# Patient Record
Sex: FEMALE | Race: WHITE | NOT HISPANIC OR LATINO | Employment: OTHER | URBAN - METROPOLITAN AREA
[De-identification: names, ages, dates, MRNs, and addresses within clinical notes are randomized per-mention and may not be internally consistent; named-entity substitution may affect disease eponyms.]

---

## 2023-01-01 ENCOUNTER — TELEPHONE (OUTPATIENT)
Dept: ADMISSION | Facility: HOSPITAL | Age: 88
End: 2023-01-01
Payer: COMMERCIAL

## 2023-01-01 ENCOUNTER — TELEPHONE (OUTPATIENT)
Dept: HEMATOLOGY/ONCOLOGY | Facility: HOSPITAL | Age: 88
End: 2023-01-01
Payer: COMMERCIAL

## 2023-01-01 ENCOUNTER — OFFICE VISIT (OUTPATIENT)
Dept: SURGERY | Facility: CLINIC | Age: 88
End: 2023-01-01
Payer: MEDICARE

## 2023-01-01 ENCOUNTER — TUMOR BOARD CONFERENCE (OUTPATIENT)
Dept: HEMATOLOGY/ONCOLOGY | Facility: HOSPITAL | Age: 88
End: 2023-01-01
Payer: COMMERCIAL

## 2023-01-01 ENCOUNTER — HOSPITAL ENCOUNTER (OUTPATIENT)
Dept: RADIOLOGY | Facility: HOSPITAL | Age: 88
Discharge: HOME | End: 2023-10-06
Payer: MEDICARE

## 2023-01-01 ENCOUNTER — LAB (OUTPATIENT)
Dept: LAB | Facility: LAB | Age: 88
End: 2023-01-01
Payer: MEDICARE

## 2023-01-01 ENCOUNTER — PREP FOR PROCEDURE (OUTPATIENT)
Dept: GASTROENTEROLOGY | Facility: HOSPITAL | Age: 88
End: 2023-01-01
Payer: COMMERCIAL

## 2023-01-01 ENCOUNTER — OFFICE VISIT (OUTPATIENT)
Dept: CARDIOLOGY | Facility: CLINIC | Age: 88
End: 2023-01-01
Payer: MEDICARE

## 2023-01-01 ENCOUNTER — OFFICE VISIT (OUTPATIENT)
Dept: HEMATOLOGY/ONCOLOGY | Facility: CLINIC | Age: 88
End: 2023-01-01
Payer: MEDICARE

## 2023-01-01 ENCOUNTER — HOSPITAL ENCOUNTER (OUTPATIENT)
Dept: RADIOLOGY | Facility: HOSPITAL | Age: 88
Discharge: HOME | End: 2023-10-27
Payer: MEDICARE

## 2023-01-01 ENCOUNTER — OFFICE VISIT (OUTPATIENT)
Dept: CARDIOLOGY | Facility: HOSPITAL | Age: 88
End: 2023-01-01
Payer: MEDICARE

## 2023-01-01 ENCOUNTER — TELEPHONE (OUTPATIENT)
Dept: PRIMARY CARE | Facility: CLINIC | Age: 88
End: 2023-01-01
Payer: COMMERCIAL

## 2023-01-01 ENCOUNTER — APPOINTMENT (OUTPATIENT)
Dept: HEMATOLOGY/ONCOLOGY | Facility: CLINIC | Age: 88
End: 2023-01-01
Payer: COMMERCIAL

## 2023-01-01 ENCOUNTER — HOSPITAL ENCOUNTER (OUTPATIENT)
Dept: RADIOLOGY | Facility: HOSPITAL | Age: 88
Discharge: HOME | End: 2023-10-19
Payer: MEDICARE

## 2023-01-01 ENCOUNTER — TELEPHONE (OUTPATIENT)
Dept: RADIOLOGY | Facility: HOSPITAL | Age: 88
End: 2023-01-01
Payer: COMMERCIAL

## 2023-01-01 ENCOUNTER — TELEPHONE (OUTPATIENT)
Dept: SURGERY | Facility: CLINIC | Age: 88
End: 2023-01-01
Payer: COMMERCIAL

## 2023-01-01 ENCOUNTER — HOSPITAL ENCOUNTER (OUTPATIENT)
Dept: RADIOLOGY | Facility: HOSPITAL | Age: 88
Discharge: HOME | End: 2023-11-13
Payer: MEDICARE

## 2023-01-01 ENCOUNTER — OFFICE VISIT (OUTPATIENT)
Dept: PRIMARY CARE | Facility: CLINIC | Age: 88
End: 2023-01-01
Payer: MEDICARE

## 2023-01-01 VITALS
SYSTOLIC BLOOD PRESSURE: 110 MMHG | TEMPERATURE: 97 F | OXYGEN SATURATION: 95 % | WEIGHT: 271 LBS | BODY MASS INDEX: 38.8 KG/M2 | DIASTOLIC BLOOD PRESSURE: 72 MMHG | HEIGHT: 70 IN

## 2023-01-01 VITALS
SYSTOLIC BLOOD PRESSURE: 120 MMHG | TEMPERATURE: 97 F | HEART RATE: 49 BPM | DIASTOLIC BLOOD PRESSURE: 73 MMHG | OXYGEN SATURATION: 92 % | RESPIRATION RATE: 16 BRPM

## 2023-01-01 VITALS
RESPIRATION RATE: 20 BRPM | OXYGEN SATURATION: 95 % | SYSTOLIC BLOOD PRESSURE: 145 MMHG | WEIGHT: 282.9 LBS | HEART RATE: 50 BPM | BODY MASS INDEX: 40.59 KG/M2 | DIASTOLIC BLOOD PRESSURE: 66 MMHG

## 2023-01-01 VITALS
BODY MASS INDEX: 39.46 KG/M2 | DIASTOLIC BLOOD PRESSURE: 64 MMHG | WEIGHT: 275 LBS | SYSTOLIC BLOOD PRESSURE: 110 MMHG | HEART RATE: 52 BPM

## 2023-01-01 VITALS
OXYGEN SATURATION: 92 % | WEIGHT: 266 LBS | HEIGHT: 70 IN | HEART RATE: 61 BPM | BODY MASS INDEX: 38.08 KG/M2 | DIASTOLIC BLOOD PRESSURE: 73 MMHG | SYSTOLIC BLOOD PRESSURE: 153 MMHG

## 2023-01-01 VITALS — DIASTOLIC BLOOD PRESSURE: 73 MMHG | HEART RATE: 59 BPM | SYSTOLIC BLOOD PRESSURE: 107 MMHG | TEMPERATURE: 98.2 F

## 2023-01-01 VITALS
WEIGHT: 275 LBS | HEIGHT: 70 IN | HEART RATE: 60 BPM | SYSTOLIC BLOOD PRESSURE: 135 MMHG | DIASTOLIC BLOOD PRESSURE: 64 MMHG | OXYGEN SATURATION: 93 % | BODY MASS INDEX: 39.37 KG/M2

## 2023-01-01 DIAGNOSIS — I10 ESSENTIAL HYPERTENSION: ICD-10-CM

## 2023-01-01 DIAGNOSIS — R92.8 ABNORMAL SCREENING MAMMOGRAM: ICD-10-CM

## 2023-01-01 DIAGNOSIS — R92.8 ABNORMAL FINDINGS ON DIAGNOSTIC IMAGING OF BREAST: ICD-10-CM

## 2023-01-01 DIAGNOSIS — D47.2 MGUS (MONOCLONAL GAMMOPATHY OF UNKNOWN SIGNIFICANCE): Primary | ICD-10-CM

## 2023-01-01 DIAGNOSIS — C50.912 MALIGNANT NEOPLASM OF LEFT BREAST IN FEMALE, ESTROGEN RECEPTOR POSITIVE, UNSPECIFIED SITE OF BREAST (MULTI): Primary | ICD-10-CM

## 2023-01-01 DIAGNOSIS — I36.1 NONRHEUMATIC TRICUSPID VALVE REGURGITATION: ICD-10-CM

## 2023-01-01 DIAGNOSIS — I51.89 RIGHT VENTRICULAR SYSTOLIC DYSFUNCTION: ICD-10-CM

## 2023-01-01 DIAGNOSIS — G47.33 OBSTRUCTIVE SLEEP APNEA: ICD-10-CM

## 2023-01-01 DIAGNOSIS — I48.92 ATRIAL FIBRILLATION AND FLUTTER (MULTI): ICD-10-CM

## 2023-01-01 DIAGNOSIS — Z12.31 ENCOUNTER FOR SCREENING MAMMOGRAM FOR BREAST CANCER: ICD-10-CM

## 2023-01-01 DIAGNOSIS — Z23 NEED FOR INFLUENZA VACCINATION: ICD-10-CM

## 2023-01-01 DIAGNOSIS — C50.919 MALIGNANT NEOPLASM OF FEMALE BREAST, UNSPECIFIED ESTROGEN RECEPTOR STATUS, UNSPECIFIED LATERALITY, UNSPECIFIED SITE OF BREAST (MULTI): Primary | ICD-10-CM

## 2023-01-01 DIAGNOSIS — I48.20 ATRIAL FIBRILLATION, CHRONIC (MULTI): ICD-10-CM

## 2023-01-01 DIAGNOSIS — N63.10 BREAST MASS, RIGHT: ICD-10-CM

## 2023-01-01 DIAGNOSIS — E66.01 MORBID OBESITY DUE TO EXCESS CALORIES (MULTI): ICD-10-CM

## 2023-01-01 DIAGNOSIS — Z79.01 ON APIXABAN THERAPY: ICD-10-CM

## 2023-01-01 DIAGNOSIS — N63.10 MASS OF RIGHT BREAST, UNSPECIFIED QUADRANT: Primary | ICD-10-CM

## 2023-01-01 DIAGNOSIS — F32.0 MAJOR DEPRESSIVE DISORDER, SINGLE EPISODE, MILD (CMS-HCC): ICD-10-CM

## 2023-01-01 DIAGNOSIS — Z00.00 MEDICARE ANNUAL WELLNESS VISIT, SUBSEQUENT: Primary | ICD-10-CM

## 2023-01-01 DIAGNOSIS — E11.621 TYPE 2 DIABETES MELLITUS WITH FOOT ULCER (CODE) (MULTI): ICD-10-CM

## 2023-01-01 DIAGNOSIS — N18.31 STAGE 3A CHRONIC KIDNEY DISEASE (MULTI): ICD-10-CM

## 2023-01-01 DIAGNOSIS — Z78.0 POST-MENOPAUSAL: Primary | ICD-10-CM

## 2023-01-01 DIAGNOSIS — R73.03 PREDIABETES: ICD-10-CM

## 2023-01-01 DIAGNOSIS — I25.10 ATHEROSCLEROSIS OF NATIVE CORONARY ARTERY OF NATIVE HEART WITHOUT ANGINA PECTORIS: ICD-10-CM

## 2023-01-01 DIAGNOSIS — E78.5 DYSLIPIDEMIA: ICD-10-CM

## 2023-01-01 DIAGNOSIS — E55.9 VITAMIN D DEFICIENCY: ICD-10-CM

## 2023-01-01 DIAGNOSIS — R92.8 ABNORMAL SCREENING MAMMOGRAM: Primary | ICD-10-CM

## 2023-01-01 DIAGNOSIS — D53.9 MACROCYTIC ANEMIA: ICD-10-CM

## 2023-01-01 DIAGNOSIS — C90.01 MULTIPLE MYELOMA IN REMISSION (MULTI): ICD-10-CM

## 2023-01-01 DIAGNOSIS — I48.20 ATRIAL FIBRILLATION, CHRONIC (MULTI): Primary | ICD-10-CM

## 2023-01-01 DIAGNOSIS — C50.919 MALIGNANT NEOPLASM OF FEMALE BREAST, UNSPECIFIED ESTROGEN RECEPTOR STATUS, UNSPECIFIED LATERALITY, UNSPECIFIED SITE OF BREAST (MULTI): ICD-10-CM

## 2023-01-01 DIAGNOSIS — Z01.810 PREOPERATIVE CARDIOVASCULAR EXAMINATION: ICD-10-CM

## 2023-01-01 DIAGNOSIS — I50.32 CHRONIC HEART FAILURE WITH PRESERVED EJECTION FRACTION (MULTI): ICD-10-CM

## 2023-01-01 DIAGNOSIS — R92.8 ABNORMAL FINDINGS ON DIAGNOSTIC IMAGING OF BREAST: Primary | ICD-10-CM

## 2023-01-01 DIAGNOSIS — Z17.0 MALIGNANT NEOPLASM OF LEFT BREAST IN FEMALE, ESTROGEN RECEPTOR POSITIVE, UNSPECIFIED SITE OF BREAST (MULTI): Primary | ICD-10-CM

## 2023-01-01 DIAGNOSIS — C90.01 MULTIPLE MYELOMA IN REMISSION (MULTI): Primary | ICD-10-CM

## 2023-01-01 DIAGNOSIS — D47.2 MGUS (MONOCLONAL GAMMOPATHY OF UNKNOWN SIGNIFICANCE): ICD-10-CM

## 2023-01-01 DIAGNOSIS — Z00.00 HEALTHCARE MAINTENANCE: Primary | ICD-10-CM

## 2023-01-01 DIAGNOSIS — C50.911 INFILTRATING DUCTAL CARCINOMA OF RIGHT BREAST (MULTI): Primary | ICD-10-CM

## 2023-01-01 DIAGNOSIS — I48.91 ATRIAL FIBRILLATION AND FLUTTER (MULTI): ICD-10-CM

## 2023-01-01 DIAGNOSIS — R60.0 BILATERAL LOWER EXTREMITY EDEMA: ICD-10-CM

## 2023-01-01 DIAGNOSIS — I50.32 CHRONIC HEART FAILURE WITH PRESERVED EJECTION FRACTION (MULTI): Primary | ICD-10-CM

## 2023-01-01 DIAGNOSIS — I35.0 SEVERE AORTIC VALVE STENOSIS: ICD-10-CM

## 2023-01-01 DIAGNOSIS — Z17.0 MALIGNANT NEOPLASM OF LEFT BREAST IN FEMALE, ESTROGEN RECEPTOR POSITIVE, UNSPECIFIED SITE OF BREAST (MULTI): ICD-10-CM

## 2023-01-01 DIAGNOSIS — C50.912 MALIGNANT NEOPLASM OF LEFT BREAST IN FEMALE, ESTROGEN RECEPTOR POSITIVE, UNSPECIFIED SITE OF BREAST (MULTI): ICD-10-CM

## 2023-01-01 DIAGNOSIS — G62.9 POLYNEUROPATHY: ICD-10-CM

## 2023-01-01 DIAGNOSIS — E87.6 CHRONICALLY LOW SERUM POTASSIUM: ICD-10-CM

## 2023-01-01 LAB
25(OH)D3 SERPL-MCNC: 36 NG/ML (ref 30–100)
ALBUMIN SERPL BCP-MCNC: 4.1 G/DL (ref 3.4–5)
ALBUMIN: 4.1 G/DL (ref 3.4–5)
ALP SERPL-CCNC: 62 U/L (ref 33–136)
ALPHA 1 GLOBULIN: 0.3 G/DL (ref 0.2–0.6)
ALPHA 2 GLOBULIN: 0.7 G/DL (ref 0.4–1.1)
ALT SERPL W P-5'-P-CCNC: 8 U/L (ref 7–45)
ANION GAP IN SER/PLAS: 13 MMOL/L (ref 10–20)
ANION GAP SERPL CALC-SCNC: 13 MMOL/L (ref 10–20)
AST SERPL W P-5'-P-CCNC: 12 U/L (ref 9–39)
BASOPHILS # BLD AUTO: 0.03 X10*3/UL (ref 0–0.1)
BASOPHILS NFR BLD AUTO: 0.6 %
BETA GLOBULIN: 0.8 G/DL (ref 0.5–1.2)
BILIRUB SERPL-MCNC: 1.2 MG/DL (ref 0–1.2)
BUN SERPL-MCNC: 28 MG/DL (ref 6–23)
CALCIUM (MG/DL) IN SER/PLAS: 9.1 MG/DL (ref 8.6–10.3)
CALCIUM SERPL-MCNC: 9.8 MG/DL (ref 8.6–10.6)
CARBON DIOXIDE, TOTAL (MMOL/L) IN SER/PLAS: 27 MMOL/L (ref 21–32)
CHLORIDE (MMOL/L) IN SER/PLAS: 103 MMOL/L (ref 98–107)
CHLORIDE SERPL-SCNC: 104 MMOL/L (ref 98–107)
CHOLEST SERPL-MCNC: 183 MG/DL (ref 0–199)
CHOLESTEROL/HDL RATIO: 5.5
CO2 SERPL-SCNC: 28 MMOL/L (ref 21–32)
CREAT SERPL-MCNC: 1.26 MG/DL (ref 0.5–1.05)
CREAT UR-MCNC: 24.8 MG/DL (ref 20–320)
CREATININE (MG/DL) IN SER/PLAS: 1.21 MG/DL (ref 0.5–1.05)
EOSINOPHIL # BLD AUTO: 0.15 X10*3/UL (ref 0–0.4)
EOSINOPHIL NFR BLD AUTO: 2.8 %
ERYTHROCYTE [DISTWIDTH] IN BLOOD BY AUTOMATED COUNT: 14.6 % (ref 11.5–14.5)
EST. AVERAGE GLUCOSE BLD GHB EST-MCNC: 148 MG/DL
GAMMA GLOBULIN: 1.7 G/DL (ref 0.5–1.4)
GFR FEMALE: 43 ML/MIN/1.73M2
GFR SERPL CREATININE-BSD FRML MDRD: 41 ML/MIN/1.73M*2
GLUCOSE (MG/DL) IN SER/PLAS: 130 MG/DL (ref 74–99)
GLUCOSE SERPL-MCNC: 129 MG/DL (ref 74–99)
HBA1C MFR BLD: 6.8 %
HCT VFR BLD AUTO: 48.7 % (ref 36–46)
HDLC SERPL-MCNC: 33 MG/DL
HGB BLD-MCNC: 14.9 G/DL (ref 12–16)
IMM GRANULOCYTES # BLD AUTO: 0.02 X10*3/UL (ref 0–0.5)
IMM GRANULOCYTES NFR BLD AUTO: 0.4 % (ref 0–0.9)
LAB AP ASR DISCLAIMER: NORMAL
LABORATORY COMMENT REPORT: NORMAL
LDLC SERPL CALC-MCNC: 130 MG/DL (ref 140–190)
LYMPHOCYTES # BLD AUTO: 1.65 X10*3/UL (ref 0.8–3)
LYMPHOCYTES NFR BLD AUTO: 31.3 %
M-PROTEIN 1: 0.5 G/DL
MAGNESIUM (MG/DL) IN SER/PLAS: 2.22 MG/DL (ref 1.6–2.4)
MCH RBC QN AUTO: 29.5 PG (ref 26–34)
MCHC RBC AUTO-ENTMCNC: 30.6 G/DL (ref 32–36)
MCV RBC AUTO: 96 FL (ref 80–100)
MICROALBUMIN UR-MCNC: 26.3 MG/L
MICROALBUMIN/CREAT UR: 106 UG/MG CREAT
MONOCYTES # BLD AUTO: 0.56 X10*3/UL (ref 0.05–0.8)
MONOCYTES NFR BLD AUTO: 10.6 %
NEUTROPHILS # BLD AUTO: 2.86 X10*3/UL (ref 1.6–5.5)
NEUTROPHILS NFR BLD AUTO: 54.3 %
NON HDL CHOLESTEROL: 150 MG/DL (ref 0–149)
NRBC BLD-RTO: 0 /100 WBCS (ref 0–0)
PATH REPORT.ADDENDUM SPEC: NORMAL
PATH REPORT.FINAL DX SPEC: NORMAL
PATH REPORT.GROSS SPEC: NORMAL
PATH REPORT.RELEVANT HX SPEC: NORMAL
PATH REPORT.TOTAL CANCER: NORMAL
PATH REVIEW-SERUM PROTEIN ELECTROPHORESIS: ABNORMAL
PLATELET # BLD AUTO: 185 X10*3/UL (ref 150–450)
PMV BLD AUTO: 9.8 FL (ref 7.5–11.5)
POTASSIUM (MMOL/L) IN SER/PLAS: 4.1 MMOL/L (ref 3.5–5.3)
POTASSIUM SERPL-SCNC: 4 MMOL/L (ref 3.5–5.3)
PROT SERPL-MCNC: 7.6 G/DL (ref 6.4–8.2)
PROT SERPL-MCNC: 7.6 G/DL (ref 6.4–8.2)
PROTEIN ELECTROPHORESIS COMMENT: ABNORMAL
RBC # BLD AUTO: 5.05 X10*6/UL (ref 4–5.2)
SODIUM (MMOL/L) IN SER/PLAS: 139 MMOL/L (ref 136–145)
SODIUM SERPL-SCNC: 141 MMOL/L (ref 136–145)
TRIGL SERPL-MCNC: 100 MG/DL (ref 0–149)
TSH SERPL-ACNC: 2.16 MIU/L (ref 0.44–3.98)
UREA NITROGEN (MG/DL) IN SER/PLAS: 26 MG/DL (ref 6–23)
VLDL: 20 MG/DL (ref 0–40)
WBC # BLD AUTO: 5.3 X10*3/UL (ref 4.4–11.3)

## 2023-01-01 PROCEDURE — 99215 OFFICE O/P EST HI 40 MIN: CPT | Performed by: STUDENT IN AN ORGANIZED HEALTH CARE EDUCATION/TRAINING PROGRAM

## 2023-01-01 PROCEDURE — 1160F RVW MEDS BY RX/DR IN RCRD: CPT | Performed by: INTERNAL MEDICINE

## 2023-01-01 PROCEDURE — 90662 IIV NO PRSV INCREASED AG IM: CPT | Performed by: FAMILY MEDICINE

## 2023-01-01 PROCEDURE — 1036F TOBACCO NON-USER: CPT

## 2023-01-01 PROCEDURE — 1126F AMNT PAIN NOTED NONE PRSNT: CPT | Performed by: SURGERY

## 2023-01-01 PROCEDURE — 1160F RVW MEDS BY RX/DR IN RCRD: CPT | Performed by: STUDENT IN AN ORGANIZED HEALTH CARE EDUCATION/TRAINING PROGRAM

## 2023-01-01 PROCEDURE — 3078F DIAST BP <80 MM HG: CPT

## 2023-01-01 PROCEDURE — 3078F DIAST BP <80 MM HG: CPT | Performed by: SURGERY

## 2023-01-01 PROCEDURE — G0008 ADMIN INFLUENZA VIRUS VAC: HCPCS | Performed by: FAMILY MEDICINE

## 2023-01-01 PROCEDURE — 99213 OFFICE O/P EST LOW 20 MIN: CPT | Performed by: NURSE PRACTITIONER

## 2023-01-01 PROCEDURE — 1160F RVW MEDS BY RX/DR IN RCRD: CPT | Performed by: SURGERY

## 2023-01-01 PROCEDURE — 1159F MED LIST DOCD IN RCRD: CPT | Performed by: INTERNAL MEDICINE

## 2023-01-01 PROCEDURE — 1036F TOBACCO NON-USER: CPT | Performed by: STUDENT IN AN ORGANIZED HEALTH CARE EDUCATION/TRAINING PROGRAM

## 2023-01-01 PROCEDURE — 76642 ULTRASOUND BREAST LIMITED: CPT | Performed by: RADIOLOGY

## 2023-01-01 PROCEDURE — 1159F MED LIST DOCD IN RCRD: CPT | Performed by: FAMILY MEDICINE

## 2023-01-01 PROCEDURE — 3074F SYST BP LT 130 MM HG: CPT | Performed by: FAMILY MEDICINE

## 2023-01-01 PROCEDURE — 3074F SYST BP LT 130 MM HG: CPT

## 2023-01-01 PROCEDURE — 1159F MED LIST DOCD IN RCRD: CPT | Performed by: SURGERY

## 2023-01-01 PROCEDURE — 19083 BX BREAST 1ST LESION US IMAG: CPT | Mod: RT

## 2023-01-01 PROCEDURE — 99214 OFFICE O/P EST MOD 30 MIN: CPT | Performed by: INTERNAL MEDICINE

## 2023-01-01 PROCEDURE — 1036F TOBACCO NON-USER: CPT | Performed by: FAMILY MEDICINE

## 2023-01-01 PROCEDURE — 80061 LIPID PANEL: CPT

## 2023-01-01 PROCEDURE — 3077F SYST BP >= 140 MM HG: CPT | Performed by: SURGERY

## 2023-01-01 PROCEDURE — 1159F MED LIST DOCD IN RCRD: CPT

## 2023-01-01 PROCEDURE — 99204 OFFICE O/P NEW MOD 45 MIN: CPT | Performed by: SURGERY

## 2023-01-01 PROCEDURE — 84165 PROTEIN E-PHORESIS SERUM: CPT | Performed by: FAMILY MEDICINE

## 2023-01-01 PROCEDURE — 3078F DIAST BP <80 MM HG: CPT | Performed by: FAMILY MEDICINE

## 2023-01-01 PROCEDURE — 99215 OFFICE O/P EST HI 40 MIN: CPT | Mod: PO

## 2023-01-01 PROCEDURE — 77065 DX MAMMO INCL CAD UNI: CPT | Mod: RT

## 2023-01-01 PROCEDURE — 77067 SCR MAMMO BI INCL CAD: CPT | Mod: 50

## 2023-01-01 PROCEDURE — 1159F MED LIST DOCD IN RCRD: CPT | Performed by: STUDENT IN AN ORGANIZED HEALTH CARE EDUCATION/TRAINING PROGRAM

## 2023-01-01 PROCEDURE — 1036F TOBACCO NON-USER: CPT | Performed by: SURGERY

## 2023-01-01 PROCEDURE — G0279 TOMOSYNTHESIS, MAMMO: HCPCS | Performed by: RADIOLOGY

## 2023-01-01 PROCEDURE — 1125F AMNT PAIN NOTED PAIN PRSNT: CPT

## 2023-01-01 PROCEDURE — 1125F AMNT PAIN NOTED PAIN PRSNT: CPT | Performed by: NURSE PRACTITIONER

## 2023-01-01 PROCEDURE — 1160F RVW MEDS BY RX/DR IN RCRD: CPT | Performed by: FAMILY MEDICINE

## 2023-01-01 PROCEDURE — 80050 GENERAL HEALTH PANEL: CPT

## 2023-01-01 PROCEDURE — 99214 OFFICE O/P EST MOD 30 MIN: CPT | Performed by: FAMILY MEDICINE

## 2023-01-01 PROCEDURE — 93000 ELECTROCARDIOGRAM COMPLETE: CPT | Performed by: INTERNAL MEDICINE

## 2023-01-01 PROCEDURE — 88305 TISSUE EXAM BY PATHOLOGIST: CPT | Performed by: PATHOLOGY

## 2023-01-01 PROCEDURE — 99205 OFFICE O/P NEW HI 60 MIN: CPT

## 2023-01-01 PROCEDURE — 83036 HEMOGLOBIN GLYCOSYLATED A1C: CPT

## 2023-01-01 PROCEDURE — 77075 RADEX OSSEOUS SURVEY COMPL: CPT | Mod: FY

## 2023-01-01 PROCEDURE — 19083 BX BREAST 1ST LESION US IMAG: CPT | Mod: RIGHT SIDE

## 2023-01-01 PROCEDURE — 77063 BREAST TOMOSYNTHESIS BI: CPT | Mod: 50

## 2023-01-01 PROCEDURE — 77063 BREAST TOMOSYNTHESIS BI: CPT | Mod: BILATERAL PROCEDURE | Performed by: RADIOLOGY

## 2023-01-01 PROCEDURE — 1160F RVW MEDS BY RX/DR IN RCRD: CPT

## 2023-01-01 PROCEDURE — 82043 UR ALBUMIN QUANTITATIVE: CPT

## 2023-01-01 PROCEDURE — 88360 TUMOR IMMUNOHISTOCHEM/MANUAL: CPT | Performed by: PATHOLOGY

## 2023-01-01 PROCEDURE — 76642 ULTRASOUND BREAST LIMITED: CPT | Mod: RT

## 2023-01-01 PROCEDURE — 82570 ASSAY OF URINE CREATININE: CPT

## 2023-01-01 PROCEDURE — 84155 ASSAY OF PROTEIN SERUM: CPT

## 2023-01-01 PROCEDURE — G0439 PPPS, SUBSEQ VISIT: HCPCS | Performed by: FAMILY MEDICINE

## 2023-01-01 PROCEDURE — 88360 TUMOR IMMUNOHISTOCHEM/MANUAL: CPT | Mod: TC,SUR,PORLAB | Performed by: SURGERY

## 2023-01-01 PROCEDURE — 1125F AMNT PAIN NOTED PAIN PRSNT: CPT | Performed by: STUDENT IN AN ORGANIZED HEALTH CARE EDUCATION/TRAINING PROGRAM

## 2023-01-01 PROCEDURE — 84165 PROTEIN E-PHORESIS SERUM: CPT

## 2023-01-01 PROCEDURE — 3074F SYST BP LT 130 MM HG: CPT | Performed by: STUDENT IN AN ORGANIZED HEALTH CARE EDUCATION/TRAINING PROGRAM

## 2023-01-01 PROCEDURE — 1125F AMNT PAIN NOTED PAIN PRSNT: CPT | Performed by: SURGERY

## 2023-01-01 PROCEDURE — 3074F SYST BP LT 130 MM HG: CPT | Performed by: INTERNAL MEDICINE

## 2023-01-01 PROCEDURE — 2720000007 HC OR 272 NO HCPCS

## 2023-01-01 PROCEDURE — 1126F AMNT PAIN NOTED NONE PRSNT: CPT | Performed by: FAMILY MEDICINE

## 2023-01-01 PROCEDURE — 1160F RVW MEDS BY RX/DR IN RCRD: CPT | Performed by: NURSE PRACTITIONER

## 2023-01-01 PROCEDURE — 1036F TOBACCO NON-USER: CPT | Performed by: INTERNAL MEDICINE

## 2023-01-01 PROCEDURE — 1126F AMNT PAIN NOTED NONE PRSNT: CPT | Performed by: INTERNAL MEDICINE

## 2023-01-01 PROCEDURE — 3078F DIAST BP <80 MM HG: CPT | Performed by: NURSE PRACTITIONER

## 2023-01-01 PROCEDURE — 82306 VITAMIN D 25 HYDROXY: CPT

## 2023-01-01 PROCEDURE — 77075 RADEX OSSEOUS SURVEY COMPL: CPT | Performed by: RADIOLOGY

## 2023-01-01 PROCEDURE — 3077F SYST BP >= 140 MM HG: CPT | Performed by: NURSE PRACTITIONER

## 2023-01-01 PROCEDURE — 3075F SYST BP GE 130 - 139MM HG: CPT | Performed by: SURGERY

## 2023-01-01 PROCEDURE — 1159F MED LIST DOCD IN RCRD: CPT | Performed by: NURSE PRACTITIONER

## 2023-01-01 PROCEDURE — 3078F DIAST BP <80 MM HG: CPT | Performed by: STUDENT IN AN ORGANIZED HEALTH CARE EDUCATION/TRAINING PROGRAM

## 2023-01-01 PROCEDURE — 77065 DX MAMMO INCL CAD UNI: CPT | Mod: RIGHT SIDE

## 2023-01-01 PROCEDURE — 3078F DIAST BP <80 MM HG: CPT | Performed by: INTERNAL MEDICINE

## 2023-01-01 PROCEDURE — 77066 DX MAMMO INCL CAD BI: CPT

## 2023-01-01 PROCEDURE — 99214 OFFICE O/P EST MOD 30 MIN: CPT | Performed by: SURGERY

## 2023-01-01 PROCEDURE — 77067 SCR MAMMO BI INCL CAD: CPT | Mod: BILATERAL PROCEDURE | Performed by: RADIOLOGY

## 2023-01-01 PROCEDURE — 1170F FXNL STATUS ASSESSED: CPT | Performed by: FAMILY MEDICINE

## 2023-01-01 PROCEDURE — 36415 COLL VENOUS BLD VENIPUNCTURE: CPT

## 2023-01-01 PROCEDURE — 77066 DX MAMMO INCL CAD BI: CPT | Performed by: RADIOLOGY

## 2023-01-01 PROCEDURE — 1036F TOBACCO NON-USER: CPT | Performed by: NURSE PRACTITIONER

## 2023-01-01 RX ORDER — TRAMADOL HYDROCHLORIDE 50 MG/1
TABLET ORAL
COMMUNITY
End: 2023-01-01 | Stop reason: ALTCHOICE

## 2023-01-01 RX ORDER — VITAMIN B COMPLEX
TABLET ORAL
COMMUNITY
Start: 2020-12-07

## 2023-01-01 RX ORDER — EMPAGLIFLOZIN 10 MG/1
1 TABLET, FILM COATED ORAL DAILY
COMMUNITY
Start: 2022-05-12

## 2023-01-01 RX ORDER — ACETAMINOPHEN 500 MG
1 TABLET ORAL DAILY
COMMUNITY
End: 2023-01-01 | Stop reason: SDUPTHER

## 2023-01-01 RX ORDER — LANOLIN ALCOHOL/MO/W.PET/CERES
1 CREAM (GRAM) TOPICAL DAILY
COMMUNITY
Start: 2020-12-07

## 2023-01-01 RX ORDER — NYSTATIN 100000 [USP'U]/G
1 POWDER TOPICAL SEE ADMIN INSTRUCTIONS
COMMUNITY
Start: 2021-03-30 | End: 2023-01-01 | Stop reason: ALTCHOICE

## 2023-01-01 RX ORDER — CARVEDILOL 6.25 MG/1
1 TABLET ORAL
COMMUNITY
Start: 2018-07-18 | End: 2023-01-01 | Stop reason: SDUPTHER

## 2023-01-01 RX ORDER — TORSEMIDE 20 MG/1
20 TABLET ORAL 2 TIMES DAILY
COMMUNITY
Start: 2022-03-24 | End: 2023-01-01 | Stop reason: SDUPTHER

## 2023-01-01 RX ORDER — POTASSIUM CHLORIDE 20 MEQ/1
TABLET, EXTENDED RELEASE ORAL 2 TIMES DAILY
COMMUNITY
End: 2023-01-01 | Stop reason: ALTCHOICE

## 2023-01-01 RX ORDER — NYSTATIN 100000 [USP'U]/G
POWDER TOPICAL
COMMUNITY
Start: 2023-01-01

## 2023-01-01 RX ORDER — CARVEDILOL 3.12 MG/1
1 TABLET ORAL
COMMUNITY
Start: 2023-03-17

## 2023-01-01 RX ORDER — FUROSEMIDE 40 MG/1
1 TABLET ORAL DAILY
COMMUNITY
End: 2023-01-01 | Stop reason: ALTCHOICE

## 2023-01-01 RX ORDER — CLONIDINE HYDROCHLORIDE 0.1 MG/1
TABLET ORAL
COMMUNITY
End: 2023-01-01 | Stop reason: ALTCHOICE

## 2023-01-01 RX ORDER — SALIVA STIMULANT COMB. NO.4
SPRAY, NON-AEROSOL (ML) MUCOUS MEMBRANE
COMMUNITY
Start: 2020-12-07

## 2023-01-01 RX ORDER — METOPROLOL TARTRATE 25 MG/1
TABLET, FILM COATED ORAL 2 TIMES DAILY
COMMUNITY
End: 2023-01-01 | Stop reason: ALTCHOICE

## 2023-01-01 RX ORDER — ACETAMINOPHEN 500 MG
1 TABLET ORAL DAILY
Qty: 90 TABLET | Refills: 3 | Status: SHIPPED | OUTPATIENT
Start: 2023-01-01 | End: 2024-04-19

## 2023-01-01 RX ORDER — PANTOPRAZOLE SODIUM 40 MG/1
TABLET, DELAYED RELEASE ORAL
COMMUNITY
Start: 2020-12-07

## 2023-01-01 RX ORDER — TORSEMIDE 20 MG/1
40 TABLET ORAL 2 TIMES DAILY
Qty: 360 TABLET | Refills: 1
Start: 2023-01-01 | End: 2024-04-19

## 2023-01-01 RX ORDER — ACETAMINOPHEN 500 MG
TABLET ORAL
COMMUNITY

## 2023-01-01 RX ORDER — LISINOPRIL 20 MG/1
1 TABLET ORAL 2 TIMES DAILY
COMMUNITY
Start: 2020-02-18

## 2023-01-01 RX ORDER — ONDANSETRON 4 MG/1
TABLET, FILM COATED ORAL EVERY 8 HOURS PRN
COMMUNITY
End: 2023-01-01 | Stop reason: ALTCHOICE

## 2023-01-01 RX ORDER — ACETAMINOPHEN 500 MG
TABLET ORAL DAILY
COMMUNITY
End: 2023-01-01 | Stop reason: SDUPTHER

## 2023-01-01 RX ORDER — DULOXETIN HYDROCHLORIDE 30 MG/1
CAPSULE, DELAYED RELEASE ORAL
COMMUNITY
End: 2023-01-01 | Stop reason: SDUPTHER

## 2023-01-01 RX ORDER — WARFARIN 2.5 MG/1
1 TABLET ORAL DAILY
COMMUNITY
End: 2023-01-01 | Stop reason: ALTCHOICE

## 2023-01-01 RX ORDER — CALCIUM CARBONATE 600 MG
1 TABLET ORAL DAILY
COMMUNITY
Start: 2022-05-10 | End: 2023-01-01 | Stop reason: ALTCHOICE

## 2023-01-01 RX ORDER — DULOXETINE HYDROCHLORIDE 20 MG/1
1 CAPSULE, DELAYED RELEASE ORAL DAILY
COMMUNITY
Start: 2016-12-12

## 2023-01-01 RX ORDER — ERGOCALCIFEROL (VITAMIN D2) 50 MCG
CAPSULE ORAL
COMMUNITY
Start: 2023-01-01 | End: 2023-01-01 | Stop reason: ALTCHOICE

## 2023-01-01 RX ORDER — APIXABAN 5 MG/1
1 TABLET, FILM COATED ORAL 2 TIMES DAILY
COMMUNITY
Start: 2021-06-25

## 2023-01-01 RX ORDER — CHOLECALCIFEROL (VITAMIN D3)
CRYSTALS MISCELLANEOUS
COMMUNITY
Start: 2022-01-18

## 2023-01-01 RX ORDER — SODIUM,POTASSIUM PHOSPHATES 280-250MG
POWDER IN PACKET (EA) ORAL
Qty: 90 PACKET | Refills: 1 | Status: SHIPPED | OUTPATIENT
Start: 2023-01-01 | End: 2024-04-19

## 2023-01-01 RX ORDER — ZINC SULFATE 50(220)MG
1 CAPSULE ORAL DAILY
COMMUNITY
Start: 2020-12-07

## 2023-01-01 RX ORDER — LISINOPRIL 10 MG/1
TABLET ORAL
COMMUNITY
Start: 2023-01-01 | End: 2023-01-01 | Stop reason: SDUPTHER

## 2023-01-01 RX ORDER — CALCIUM CARBONATE/VITAMIN D3 600MG-62.5
CAPSULE ORAL
COMMUNITY
Start: 2023-01-01

## 2023-01-01 RX ORDER — CARVEDILOL 12.5 MG/1
TABLET ORAL
COMMUNITY
Start: 2023-03-01 | End: 2023-01-01 | Stop reason: SDUPTHER

## 2023-01-01 RX ORDER — ANASTROZOLE 1 MG/1
1 TABLET ORAL DAILY
Qty: 90 TABLET | Refills: 3 | Status: SHIPPED | OUTPATIENT
Start: 2023-01-01 | End: 2024-04-19

## 2023-01-01 RX ORDER — OMEGA-3 FATTY ACIDS/FISH OIL 340-1000MG
CAPSULE ORAL 2 TIMES DAILY
COMMUNITY
End: 2023-01-01 | Stop reason: ALTCHOICE

## 2023-01-01 RX ORDER — CLOBETASOL PROPIONATE 0.5 MG/G
1 EMULSION TOPICAL SEE ADMIN INSTRUCTIONS
COMMUNITY
Start: 2021-03-30

## 2023-01-01 RX ORDER — SODIUM,POTASSIUM PHOSPHATES 280-250MG
POWDER IN PACKET (EA) ORAL
COMMUNITY
Start: 2023-01-01 | End: 2023-01-01 | Stop reason: ALTCHOICE

## 2023-01-01 RX ORDER — AMLODIPINE BESYLATE 10 MG/1
1 TABLET ORAL DAILY
COMMUNITY
End: 2023-01-01 | Stop reason: ALTCHOICE

## 2023-01-01 RX ORDER — MOMETASONE FUROATE 1 MG/G
CREAM TOPICAL
COMMUNITY

## 2023-01-01 RX ORDER — HYDROCORTISONE 1 %
CREAM (GRAM) TOPICAL 2 TIMES DAILY
COMMUNITY
Start: 2020-12-07 | End: 2023-01-01 | Stop reason: ALTCHOICE

## 2023-01-01 RX ORDER — FAMOTIDINE 40 MG/1
1 TABLET, FILM COATED ORAL DAILY
COMMUNITY
End: 2023-01-01 | Stop reason: ALTCHOICE

## 2023-01-01 ASSESSMENT — ENCOUNTER SYMPTOMS
NAUSEA: 0
LOSS OF SENSATION IN FEET: 0
GASTROINTESTINAL NEGATIVE: 1
HEMATOLOGIC/LYMPHATIC NEGATIVE: 1
COUGH: 0
FEVER: 0
WEAKNESS: 1
CHEST TIGHTNESS: 0
VOMITING: 0
HEADACHES: 0
PALPITATIONS: 0
WHEEZING: 0
ALLERGIC/IMMUNOLOGIC NEGATIVE: 1
COUGH: 0
PALPITATIONS: 0
DIZZINESS: 1
EYES NEGATIVE: 1
ARTHRALGIAS: 1
NAUSEA: 0
HEADACHES: 0
BLOOD IN STOOL: 0
OCCASIONAL FEELINGS OF UNSTEADINESS: 0
CHILLS: 0
SHORTNESS OF BREATH: 0
RESPIRATORY NEGATIVE: 1
SHORTNESS OF BREATH: 0
FEVER: 0
WEAKNESS: 0
ENDOCRINE NEGATIVE: 1
CONSTITUTIONAL NEGATIVE: 1
ABDOMINAL DISTENTION: 0
EYES NEGATIVE: 1
CONFUSION: 0
SHORTNESS OF BREATH: 0
FEVER: 0
COUGH: 0
HEMATURIA: 0
LIGHT-HEADEDNESS: 0
ABDOMINAL PAIN: 0
ACTIVITY CHANGE: 0
CHILLS: 0
DEPRESSION: 0
VOMITING: 0
DIZZINESS: 0
PALPITATIONS: 0
PSYCHIATRIC NEGATIVE: 1
ABDOMINAL PAIN: 0
DIZZINESS: 0
CHILLS: 0

## 2023-01-01 ASSESSMENT — PATIENT HEALTH QUESTIONNAIRE - PHQ9
2. FEELING DOWN, DEPRESSED OR HOPELESS: NOT AT ALL
2. FEELING DOWN, DEPRESSED OR HOPELESS: NOT AT ALL
SUM OF ALL RESPONSES TO PHQ9 QUESTIONS 1 AND 2: 0
SUM OF ALL RESPONSES TO PHQ9 QUESTIONS 1 AND 2: 0
1. LITTLE INTEREST OR PLEASURE IN DOING THINGS: NOT AT ALL
SUM OF ALL RESPONSES TO PHQ9 QUESTIONS 1 AND 2: 0
1. LITTLE INTEREST OR PLEASURE IN DOING THINGS: NOT AT ALL
1. LITTLE INTEREST OR PLEASURE IN DOING THINGS: NOT AT ALL
2. FEELING DOWN, DEPRESSED OR HOPELESS: NOT AT ALL

## 2023-01-01 ASSESSMENT — ACTIVITIES OF DAILY LIVING (ADL)
TAKING_MEDICATION: INDEPENDENT
DRESSING: INDEPENDENT
BATHING: INDEPENDENT
MANAGING_FINANCES: INDEPENDENT
GROCERY_SHOPPING: INDEPENDENT
DOING_HOUSEWORK: INDEPENDENT

## 2023-01-01 ASSESSMENT — COLUMBIA-SUICIDE SEVERITY RATING SCALE - C-SSRS
1. IN THE PAST MONTH, HAVE YOU WISHED YOU WERE DEAD OR WISHED YOU COULD GO TO SLEEP AND NOT WAKE UP?: NO
2. HAVE YOU ACTUALLY HAD ANY THOUGHTS OF KILLING YOURSELF?: NO
6. HAVE YOU EVER DONE ANYTHING, STARTED TO DO ANYTHING, OR PREPARED TO DO ANYTHING TO END YOUR LIFE?: NO

## 2023-01-01 ASSESSMENT — PAIN SCALES - GENERAL: PAINLEVEL: 2

## 2023-09-27 PROBLEM — K21.9 GERD (GASTROESOPHAGEAL REFLUX DISEASE): Status: ACTIVE | Noted: 2023-01-01

## 2023-09-27 PROBLEM — G47.33 OBSTRUCTIVE SLEEP APNEA: Status: ACTIVE | Noted: 2023-01-01

## 2023-09-27 PROBLEM — I73.89 OTHER SPECIFIED PERIPHERAL VASCULAR DISEASES (CMS-HCC): Status: ACTIVE | Noted: 2023-01-01

## 2023-09-27 PROBLEM — M48.00 SPINAL STENOSIS: Status: ACTIVE | Noted: 2023-01-01

## 2023-09-27 PROBLEM — R73.03 PREDIABETES: Status: ACTIVE | Noted: 2023-01-01

## 2023-09-27 PROBLEM — N18.2 STAGE 2 CHRONIC KIDNEY DISEASE: Status: ACTIVE | Noted: 2023-01-01

## 2023-09-27 PROBLEM — I35.0 SEVERE AORTIC VALVE STENOSIS: Status: ACTIVE | Noted: 2023-01-01

## 2023-09-27 PROBLEM — D53.9 MACROCYTIC ANEMIA: Status: ACTIVE | Noted: 2023-01-01

## 2023-09-27 PROBLEM — R82.71 ASYMPTOMATIC BACTERIURIA: Status: ACTIVE | Noted: 2023-01-01

## 2023-09-27 PROBLEM — F32.0 MAJOR DEPRESSIVE DISORDER, SINGLE EPISODE, MILD (CMS-HCC): Status: ACTIVE | Noted: 2023-01-01

## 2023-09-27 PROBLEM — E87.6: Status: ACTIVE | Noted: 2023-01-01

## 2023-09-27 PROBLEM — R60.9 DEPENDENT EDEMA: Status: ACTIVE | Noted: 2023-01-01

## 2023-09-27 PROBLEM — N18.31 STAGE 3A CHRONIC KIDNEY DISEASE (MULTI): Status: ACTIVE | Noted: 2023-01-01

## 2023-09-27 PROBLEM — I48.20 ATRIAL FIBRILLATION, CHRONIC (MULTI): Status: ACTIVE | Noted: 2023-01-01

## 2023-09-27 PROBLEM — E78.5 DYSLIPIDEMIA: Status: ACTIVE | Noted: 2023-01-01

## 2023-09-27 PROBLEM — D69.2 OTHER NONTHROMBOCYTOPENIC PURPURA (CMS-HCC): Status: ACTIVE | Noted: 2023-01-01

## 2023-09-27 PROBLEM — R26.89 POOR BALANCE: Status: ACTIVE | Noted: 2023-01-01

## 2023-09-27 PROBLEM — I50.42 CHRONIC COMBINED SYSTOLIC AND DIASTOLIC CONGESTIVE HEART FAILURE (MULTI): Status: ACTIVE | Noted: 2023-01-01

## 2023-09-27 PROBLEM — L03.119 CELLULITIS OF LOWER EXTREMITY: Status: ACTIVE | Noted: 2023-01-01

## 2023-09-27 PROBLEM — I10 ESSENTIAL HYPERTENSION: Status: ACTIVE | Noted: 2023-01-01

## 2023-09-27 PROBLEM — C90.01 MULTIPLE MYELOMA IN REMISSION (MULTI): Status: ACTIVE | Noted: 2023-01-01

## 2023-09-27 PROBLEM — I25.10 ATHEROSCLEROSIS OF NATIVE CORONARY ARTERY OF NATIVE HEART WITHOUT ANGINA PECTORIS: Status: ACTIVE | Noted: 2023-01-01

## 2023-09-27 PROBLEM — I07.1 TRICUSPID VALVE REGURGITATION: Status: ACTIVE | Noted: 2023-01-01

## 2023-09-27 PROBLEM — G62.9 POLYNEUROPATHY: Status: ACTIVE | Noted: 2023-01-01

## 2023-09-27 PROBLEM — M25.551 RIGHT HIP PAIN: Status: ACTIVE | Noted: 2023-01-01

## 2023-09-27 PROBLEM — D47.2 MGUS (MONOCLONAL GAMMOPATHY OF UNKNOWN SIGNIFICANCE): Status: ACTIVE | Noted: 2023-01-01

## 2023-09-27 PROBLEM — R53.81 PHYSICAL DECONDITIONING: Status: ACTIVE | Noted: 2023-01-01

## 2023-09-27 PROBLEM — I50.30 (HFPEF) HEART FAILURE WITH PRESERVED EJECTION FRACTION (MULTI): Status: ACTIVE | Noted: 2023-01-01

## 2023-09-27 PROBLEM — M54.50 LOW BACK PAIN: Status: ACTIVE | Noted: 2023-01-01

## 2023-09-27 PROBLEM — R60.0 BILATERAL LOWER EXTREMITY EDEMA: Status: ACTIVE | Noted: 2023-01-01

## 2023-09-27 PROBLEM — Z86.718 HISTORY OF DVT (DEEP VEIN THROMBOSIS): Status: ACTIVE | Noted: 2023-01-01

## 2023-09-27 PROBLEM — R53.1 GENERAL WEAKNESS: Status: ACTIVE | Noted: 2023-01-01

## 2023-09-27 PROBLEM — I51.89 RIGHT VENTRICULAR SYSTOLIC DYSFUNCTION: Status: ACTIVE | Noted: 2023-01-01

## 2023-09-27 PROBLEM — G47.19 EXCESSIVE DAYTIME SLEEPINESS: Status: ACTIVE | Noted: 2023-01-01

## 2023-09-27 PROBLEM — S89.92XA INJURY OF LEFT KNEE: Status: ACTIVE | Noted: 2023-01-01

## 2023-09-27 PROBLEM — E66.01 MORBID OBESITY DUE TO EXCESS CALORIES (MULTI): Status: ACTIVE | Noted: 2023-01-01

## 2023-09-27 PROBLEM — E11.621 TYPE 2 DIABETES MELLITUS WITH FOOT ULCER (CODE) (MULTI): Status: ACTIVE | Noted: 2023-01-01

## 2023-09-27 PROBLEM — I87.2 CHRONIC VENOUS STASIS DERMATITIS: Status: ACTIVE | Noted: 2023-01-01

## 2023-09-27 PROBLEM — M17.0 PRIMARY OSTEOARTHRITIS OF BOTH KNEES: Status: ACTIVE | Noted: 2023-01-01

## 2023-09-27 NOTE — PROGRESS NOTES
"Subjective   Reason for Visit: Micaela Garcia is an 88 y.o. female here for a Medicare Wellness visit.     Past Medical, Surgical, and Family History reviewed and updated in chart.    Reviewed all medications by prescribing practitioner or clinical pharmacist (such as prescriptions, OTCs, herbal therapies and supplements) and documented in the medical record.    HPI       Patient Care Team:  Bel Valadez DO as PCP - General (Family Medicine)     Review of Systems    Objective   Vitals:  /72   Temp 36.1 °C (97 °F)   Ht 1.778 m (5' 10\")   Wt 123 kg (271 lb)   SpO2 95%   BMI 38.88 kg/m²       Physical Exam    Assessment/Plan   Problem List Items Addressed This Visit       Bilateral lower extremity edema    Chronically low serum potassium    Dyslipidemia    Relevant Orders    Lipid Panel    TSH with reflex to Free T4 if abnormal    Essential hypertension    Relevant Orders    TSH with reflex to Free T4 if abnormal    Macrocytic anemia    Relevant Orders    CBC and Auto Differential    Prediabetes    Relevant Orders    Comprehensive Metabolic Panel    Hemoglobin A1C    TSH with reflex to Free T4 if abnormal    Stage 3a chronic kidney disease (CMS/HCC)    Relevant Orders    Comprehensive Metabolic Panel     Other Visit Diagnoses       Routine general medical examination at health care facility    -  Primary    Vitamin D deficiency        Relevant Orders    Vitamin D 25-Hydroxy,Total (for eval of Vitamin D levels)    Encounter for screening mammogram for breast cancer        Relevant Orders    BI mammo bilateral screening tomosynthesis    Need for influenza vaccination        Relevant Orders    Flu vaccine, quadrivalent, high-dose, preservative free, age 65y+ (FLUZONE) (Completed)               "

## 2023-09-27 NOTE — PROGRESS NOTES
"Subjective   Reason for Visit: Micaela Garcia is an 88 y.o. female here for a Medicare Wellness visit.     Past Medical, Surgical, and Family History reviewed and updated in chart.    Reviewed all medications by prescribing practitioner or clinical pharmacist (such as prescriptions, OTCs, herbal therapies and supplements) and documented in the medical record.    HPI   patient is here for medicare wellness and med refill and follow up.  She does not know her medications well, new system has multiple meds pt does not think she is taking.  Updated med list with old system ([facility]) and asked pt to call [facility] when she gets back to [facility] w correct med ist and then will send her refills    Her bp is in good range.  She is seeing cardiology regularly    She is on Eliquis, no bleeding   On PPI, taking correctly, gerd controlled    Taking duloxetine, she wanted to go off med but other Dr thought she should stay on so taking lowest dose, no problems w med    She takes jardiance daily    She states has hx of Multiple myeloma and is suppose to have WBC checked yearly, saw Dr Smyth in the past and was in remission and told her pcp can monitor.  I suspect she is to have spep drawn yearly      Patient Care Team:  Bel Valadez,  as PCP - General (Family Medicine)     Review of Systems    Objective   Vitals:  /72   Temp 36.1 °C (97 °F)   Ht 1.778 m (5' 10\")   Wt 123 kg (271 lb)   SpO2 95%   BMI 38.88 kg/m²       Physical Exam  Constitutional:       Appearance: She is obese.   HENT:      Head: Normocephalic.      Right Ear: Tympanic membrane normal.      Left Ear: Tympanic membrane normal.      Mouth/Throat:      Pharynx: Oropharynx is clear.   Neck:      Vascular: No carotid bruit.   Cardiovascular:      Rate and Rhythm: Normal rate. Rhythm irregular.      Pulses: Normal pulses.      Heart sounds: Murmur heard.   Pulmonary:      Effort: Pulmonary effort is normal.      Breath sounds: Normal breath sounds. "   Abdominal:      General: Bowel sounds are normal.      Palpations: Abdomen is soft. There is no mass.   Musculoskeletal:      Cervical back: Normal range of motion.      Right lower leg: Edema present.      Left lower leg: Edema present.   Skin:     General: Skin is warm and dry.   Neurological:      General: No focal deficit present.      Mental Status: She is alert and oriented to person, place, and time.      Sensory: Sensory deficit present.      Comments: Sensory deficit feet, no open wounds. Tinea of nails   Psychiatric:         Mood and Affect: Mood normal.         Thought Content: Thought content normal.         Judgment: Judgment normal.         Assessment/Plan   Problem List Items Addressed This Visit       Bilateral lower extremity edema    Chronically low serum potassium    Dyslipidemia    Relevant Orders    Lipid Panel    TSH with reflex to Free T4 if abnormal    Essential hypertension    Relevant Orders    TSH with reflex to Free T4 if abnormal    Macrocytic anemia    Relevant Orders    CBC and Auto Differential    Prediabetes    Relevant Orders    Comprehensive Metabolic Panel    Hemoglobin A1C    TSH with reflex to Free T4 if abnormal    Stage 3a chronic kidney disease (CMS/HCC)    Relevant Orders    Comprehensive Metabolic Panel     Other Visit Diagnoses       Routine general medical examination at health care facility    -  Primary    Vitamin D deficiency        Relevant Orders    Vitamin D 25-Hydroxy,Total (for eval of Vitamin D levels)    Encounter for screening mammogram for breast cancer        Relevant Orders    BI mammo bilateral screening tomosynthesis    Need for influenza vaccination        Relevant Orders    Flu vaccine, quadrivalent, high-dose, preservative free, age 65y+ (FLUZONE) (Completed)        Fasting labs ordered  Urine for albumin  Discussed needs eye exam for dm  Flu vaccine today  Declined dexa  Ordered spep for remission mult myeloma  Reviewed vaccines, does not want  shingrix  Will call w med list, updated from [facility] but unsure if accurate, will refill after get accurate med list.  Follow up 3-4 months or prn sooner

## 2023-09-27 NOTE — PROGRESS NOTES
"Subjective   Reason for Visit: Micaela Garcia is an 88 y.o. female here for a Medicare Wellness visit.     Past Medical, Surgical, and Family History reviewed and updated in chart.    Reviewed all medications by prescribing practitioner or clinical pharmacist (such as prescriptions, OTCs, herbal therapies and supplements) and documented in the medical record.    HPI     Patient presents to the clinic for a medical wellness visit.    She had questions regarding her Cymbalta 20 mg medication. She had her Pneumonia vaccine last year along with her influenza  shot. She states that she needs to get her influenza shot for this year.    She mentions that she has not had a mammogram recently. Her sister suffers from breast cancer. She is concerned  about having to stand up during mammogram (can only stand for 3-4 minutes at a time) and is hoping to sit down for the test.    Patient Care Team:  Bel Valadez DO as PCP - General (Family Medicine)     Review of Systems    Objective   Vitals:  /72   Temp 36.1 °C (97 °F)   Ht 1.778 m (5' 10\")   Wt 123 kg (271 lb)   SpO2 95%   BMI 38.88 kg/m²       Physical Exam    Assessment/Plan   Problem List Items Addressed This Visit          Cardiac and Vasculature    Dyslipidemia    Relevant Orders    Lipid Panel    TSH with reflex to Free T4 if abnormal    Essential hypertension    Relevant Orders    TSH with reflex to Free T4 if abnormal       Endocrine/Metabolic    Prediabetes    Relevant Orders    Comprehensive Metabolic Panel    Hemoglobin A1C    TSH with reflex to Free T4 if abnormal       Genitourinary and Reproductive    Chronically low serum potassium    Stage 3a chronic kidney disease (CMS/HCC)    Relevant Orders    Comprehensive Metabolic Panel       Hematology and Neoplasia    Macrocytic anemia    Relevant Orders    CBC and Auto Differential       Symptoms and Signs    Bilateral lower extremity edema     Other Visit Diagnoses       Routine general medical " examination at health care facility    -  Primary    Vitamin D deficiency        Relevant Orders    Vitamin D 25-Hydroxy,Total (for eval of Vitamin D levels)    Encounter for screening mammogram for breast cancer        Relevant Orders    BI mammo bilateral screening tomosynthesis    Need for influenza vaccination        Relevant Orders    Flu vaccine, quadrivalent, high-dose, preservative free, age 65y+ (FLUZONE) (Completed)          Plan: The patient will have her labs (mammo bilateral screening, CBC, CMP, Hemoglobin A1c, lipid panel, TSH, Vitamin D screening)   done again in 6 months and she will follow-up after her labs. She does not need any medication refills at this time.    The patient received her influenza shot in clinic today.

## 2023-09-27 NOTE — PROGRESS NOTES
"Subjective   Reason for Visit: Micaela Garcia is an 88 y.o. female here for a Medicare Wellness visit.     Past Medical, Surgical, and Family History reviewed and updated in chart.    Reviewed all medications by prescribing practitioner or clinical pharmacist (such as prescriptions, OTCs, herbal therapies and supplements) and documented in the medical record.    HPI       Patient Care Team:  Bel Valadez DO as PCP - General (Family Medicine)     Review of Systems    Objective   Vitals:  /72   Temp 36.1 °C (97 °F)   Ht 1.778 m (5' 10\")   Wt 123 kg (271 lb)   SpO2 95%   BMI 38.88 kg/m²       Physical Exam    Assessment/Plan   Problem List Items Addressed This Visit       Bilateral lower extremity edema    Chronically low serum potassium    Dyslipidemia    Relevant Orders    Lipid Panel    TSH with reflex to Free T4 if abnormal    Essential hypertension    Relevant Orders    TSH with reflex to Free T4 if abnormal    Macrocytic anemia    Relevant Orders    CBC and Auto Differential    Prediabetes    Relevant Orders    Comprehensive Metabolic Panel    Hemoglobin A1C    TSH with reflex to Free T4 if abnormal    Stage 3a chronic kidney disease (CMS/HCC)    Relevant Orders    Comprehensive Metabolic Panel     Other Visit Diagnoses       Routine general medical examination at health care facility    -  Primary    Vitamin D deficiency        Relevant Orders    Vitamin D 25-Hydroxy,Total (for eval of Vitamin D levels)    Encounter for screening mammogram for breast cancer        Relevant Orders    BI mammo bilateral screening tomosynthesis               "

## 2023-09-28 NOTE — ASSESSMENT & PLAN NOTE
Pt states in remission for a number of years and states is suppose to have cbc yearly.  She was seeing Dr Smyth in the past and she states she released her back to Dr Henry.  I suspect she is suppose to have SPEP drawn yearly

## 2023-10-19 NOTE — NURSING NOTE
After patient review of diagnostic results with Dr. Ellsworth, support provided. Written literature regarding abnormal breast imaging and breast biopsy including what to expect before, during, and after the procedure reviewed with the patient. All questions answered. Patient selected Dr. Rueda for surgical consultation 10/27 10AM with biopsy to follow 1130AM to accommodate patient scheduling preferences. Information provided and reviewed to include provider information and how to reach me directly with questions or concerns before concluding visit.   Not resolved   Referred to surgery

## 2023-10-19 NOTE — PROGRESS NOTES
Patient follow up scheduling:  right breast US biopsy 10/27 1130AM per provider recommendation. Order pending Dr. Rueda signature.

## 2023-10-23 PROBLEM — Z01.810 PREOPERATIVE CARDIOVASCULAR EXAMINATION: Status: ACTIVE | Noted: 2023-01-01

## 2023-10-23 PROBLEM — Z79.01 ON APIXABAN THERAPY: Status: ACTIVE | Noted: 2023-01-01

## 2023-10-23 NOTE — PROGRESS NOTES
[facility]    Patient Name: Micaela Gacria  Patient : 1935      Scribe Attestation  By signing my name below, I, Luis Alfredo Chang   attest that this documentation has been prepared under the direction and in the presence of Palomo Membreno DO.       Reason for visit:  This is an 88-year-old female here for follow-up regarding her history of chronic atrial fibrillation, anticoagulation with apixaban, HFpEF/right ventricular systolic dysfunction, moderate to severe aortic valve stenosis, moderate to severe tricuspid valve regurgitation, mild coronary artery disease as seen on prior cardiac catheterization done in , hypertension, dyslipidemia, obstructive sleep apnea, and morbid obesity.    HPI:  This is an 88-year-old female here for follow-up regarding her history of chronic atrial fibrillation, anticoagulation with apixaban, HFpEF/right ventricular systolic dysfunction, moderate to severe aortic valve stenosis, moderate to severe tricuspid valve regurgitation, mild coronary artery disease as seen on prior cardiac catheterization done in , hypertension, dyslipidemia, obstructive sleep apnea, and morbid obesity.  The patient was last evaluated by me in 2023.  At that visit I had reduced carvedilol to 3.125 mg twice daily, recommended referral for ENRIQUE/cath and evaluation by the structural heart clinic which the patient declined, ordered an echocardiogram to be completed in 6 months, and asked the patient to follow-up in 6 months and sooner if necessary.  Patient was subsequently seen by the cardiology PA in 2023 at which time she was doing reasonably well and asked to follow-up again in March or 2024.  Patient subsequently scheduled a sooner follow-up appointment for preoperative cardiovascular examination prior to a surgical procedure.  CMP done in 2023 showed normal serum sodium and potassium with a serum creatinine of 1.26  consistent with known CKD, normal ALT/AST, hemoglobin A1c of 6.8%, TSH 2.16, hemoglobin of 14.9.  Lipid panel done in October 2023 showed an LDL cholesterol 130 and triglycerides of 100 not currently on any lipid-lowering medical therapy.  Echocardiogram done 9/5/2023 showed normal left ventricular systolic function with an ejection fraction of 70%, low normal right ventricular systolic function, biatrial dilatation, moderate to severe tricuspid valve regurgitation, mild aortic valve regurgitation, moderate to severe aortic valve stenosis with a mean pressure gradient of 25.1 mmHg, dimensionless index 0.33, and a calculated aortic valve area of 0.78 cm². ECG done today showed atrial fibrillation with slow ventricular response and a heart rate of 52 bpm.  Patient reports that she has been feeling generally well from the cardiac perspective.  She is planned to have a breast biopsy in the next week.  She states she is still uninterested in pursuing further valvular heart disease work-up or referral to the structural heart clinic.  During my exam she was resting comfortably in no wheelchair.       Assessment/Plan:   1. Chronic atrial fibrillation  The patient has a history of chronic atrial fibrillation on apixaban for thromboembolism prophylaxis, which should be continued.  She should continue carvedilol for heart rate control.  ECG done today showed atrial fibrillation with slow ventricular response and a heart rate of 52 bpm.   She denies chest pain, palpitations or lightheadedness.   No plans for rhythm control approach at this time.  Echocardiogram done 9/5/2023 showed normal left ventricular systolic function with an ejection fraction of 70%, low normal right ventricular systolic function, biatrial dilatation, moderate to severe tricuspid valve regurgitation, mild aortic valve regurgitation, moderate to severe aortic valve stenosis with a mean pressure gradient of 25.1 mmHg, dimensionless index 0.33, and a  calculated aortic valve area of 0.78 cm².  Recent lab works as noted in the HPI.   Follow up in 6 months and sooner if necessary.      2. Anticoagulation with apixaban  The patient is currently on apixaban for chronic atrial fibrillation.  Recent lab works as noted in the HPI.      3. HFpEF/Right ventricular systolic dysfunction  The patient has a history of HFpEF/right ventricular systolic dysfunction.  Echocardiogram done 9/5/2023 showed normal left ventricular systolic function with an ejection fraction of 70%, low normal right ventricular systolic function, biatrial dilatation, moderate to severe tricuspid valve regurgitation, mild aortic valve regurgitation, moderate to severe aortic valve stenosis with a mean pressure gradient of 25.1 mmHg, dimensionless index 0.33, and a calculated aortic valve area of 0.78 cm².  She does not appear significantly volume overloaded on exam today.  She should continue her current cardiac medications.  Recent lab works as noted in the HPI.   I discussed with her the importance of following a low-sodium heart healthy diet, wearing compression stockings and elevating legs when seated.  Follow up in 6 months and sooner if necessary.      4. Moderate to severe aortic valve stenosis  The patient was noted to have moderate to severe aortic valve stenosis on recent echocardiogram.  Echocardiogram done 9/5/2023 showed normal left ventricular systolic function with an ejection fraction of 70%, low normal right ventricular systolic function, biatrial dilatation, moderate to severe tricuspid valve regurgitation, mild aortic valve regurgitation, moderate to severe aortic valve stenosis with a mean pressure gradient of 25.1 mmHg, dimensionless index 0.33, and a calculated aortic valve area of 0.78 cm².  Discussed again the risks and benefits ENRIQUE and cardiac catheterization, as well as referral to the structural heart clinic for further evaluation and management. She expressed understanding  and declined further work-up or referral at this time.  Follow up in 6 months and sooner if necessary.      5. Tricuspid valve regurgitation  The patient has a history of moderate to severe tricuspid valve regurgitation.  Echocardiogram done 9/5/2023 showed normal left ventricular systolic function with an ejection fraction of 70%, low normal right ventricular systolic function, biatrial dilatation, moderate to severe tricuspid valve regurgitation, mild aortic valve regurgitation, moderate to severe aortic valve stenosis with a mean pressure gradient of 25.1 mmHg, dimensionless index 0.33, and a calculated aortic valve area of 0.78 cm².  Continue to monitor clinically for now.     6. Coronary artery disease  The patient had cardiac catheterization done in 2004 which showed mild coronary artery disease.  ECG done today showed atrial fibrillation with slow ventricular response and a heart rate of 52 bpm.   She denies anginal chest discomfort.  Blood pressure appears controlled on exam today.  She should continue current antihypertensive medications.  Echocardiogram done 9/5/2023 showed normal left ventricular systolic function with an ejection fraction of 70%, low normal right ventricular systolic function, biatrial dilatation, moderate to severe tricuspid valve regurgitation, mild aortic valve regurgitation, moderate to severe aortic valve stenosis with a mean pressure gradient of 25.1 mmHg, dimensionless index 0.33, and a calculated aortic valve area of 0.78 cm².  Recent lab works as noted in the HPI.   Lipid panel done in October 2023 showed an LDL cholesterol 130 and triglycerides of 100 not currently on any lipid-lowering medical therapy.    I discussed possible addition of lipid-lowering medications which the patient declined.  Please see lifestyle recommendations below.   Follow up in 6 months and sooner if necessary.      7. Hypertension  The patient has a history of hypertension and blood pressure appears  controlled on exam today.   She should continue her current antihypertensive medications.      8. Dyslipidemia   Lipid panel done in October 2023 showed an LDL cholesterol 130 and triglycerides of 100 not currently on any lipid-lowering medical therapy.    I discussed possible addition of lipid-lowering medications which the patient declined.  Please see lifestyle recommendations below.     9. Diabetes Mellitus  Hemoglobin A1c done in October 2023 was 6.8%.  Medication management as per PCP.     10. Obstructive sleep apnea  The patient is noncompliant with CPAP therapy.  She had previously declined referral to Sleep Medicine.     11. Morbid obesity  Please see lifestyle recommendations below.     12. Preoperative cardiovascular examination  The patient is being evaluated for a breast biopsy. She does have a history of chronic atrial fibrillation on apixaban for thromboembolism prophylaxis, moderate to severe aortic valve stenosis, moderate to severe tricuspid valve regurgitation, mild coronary artery disease, and HFpEF/right ventricular systolic dysfunction with an ejection fraction of 70% seen on recent echocardiogram. She denies a history of  ischemic cerebrovascular disease, insulin-dependent diabetes mellitus, or significant renal dysfunction.     RCRI of 2 based on history placing her at elevated risk of perioperative MACE.    I do not believe any additional preoperative testing is necessary at this time. Patient should continue his current cardiac medications perioperatively. The patient can hold apixaban for 48-72 hours prior to the procedure depending on the disposition of the surgical service. Overall, the patient appears to be elevated risk for perioperative MACE. The patient expressed understanding of her risk for perioperative cardiovascular complications.       Orders:   Preoperative cardiovascular examination  Valvular heart disease work-up including ENRIQUE/plan/referral to structural heart clinic  recommended - patient declined  Recommended statin therapy -patient declined  Follow-up in 6 months.      Lifestyle Recommendations  I recommend a whole-food plant-based diet, an eating pattern that encourages the consumption of unrefined plant foods (such as fruits, vegetables, tubers, whole grains, legumes, nuts and seeds) and discourages meats, dairy products, eggs and processed foods.     The AHA/ACC recommends that the patient consume a dietary pattern that emphasizes intake of vegetables, fruits, and whole grains; includes low-fat dairy products, poultry, fish, legumes, non-tropical vegetable oils, and nuts; and limits intake of sodium, sweets, sugar-sweetened beverages, and red meats.  Adapt this dietary pattern to appropriate calorie requirements (a 500-750 kcal/day deficit to loose weight), personal and cultural food preferences, and nutrition therapy for other medical conditions (including diabetes).  Achieve this pattern by following plans such as the [place] Mediterranean, DASH dietary pattern, or AHA diet.     Engage in 2 hours and 30 minutes per week of moderate-intensity physical activity, or 1 hour and 15 minutes (75 minutes) per week of vigorous-intensity aerobic physical activity, or an equivalent combination of moderate and vigorous-intensity aerobic physical activity. Aerobic activity should be performed in episodes of at least 10 minutes preferably spread throughout the week.     Adhering to a heart healthy diet, regular exercise habits, avoidance of tobacco products, and maintenance of a healthy weight are crucial components of their heart disease risk reduction.     Any positive review of systems not specifically addressed in the office visit today should be evaluated and treated by the patients primary care physician or in an emergency department if necessary     Patient was notified that results from ordered tests will be called to the patient if it changes current management; it will  otherwise be discussed at a future appointment and available on  [email or web address].     Thank you for allowing me to participate in the care of this patient.        This document was generated using the assistance of voice recognition software. If there are any errors of spelling, grammar, syntax, or meaning; please feel free to contact me directly for clarification.    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 (CMS/HCC), Contusion of left lower leg, subsequent encounter (06/07/2019), Contusion of lower back and pelvis, initial encounter (10/31/2017), Diaphragmatic hernia without obstruction or gangrene, Other abnormal glucose (12/12/2016), Perianal venous thrombosis (03/23/2018), Personal history of diseases of the skin and subcutaneous tissue (07/18/2018), Personal history of diseases of the skin and subcutaneous tissue (06/21/2018), Personal history of other diseases of the digestive system, Personal history of other endocrine, nutritional and metabolic disease, Personal history of other specified conditions (03/26/2019), Personal history of other specified conditions (03/19/2018), Personal history of other specified conditions (02/23/2017), Personal history of other specified conditions (08/15/2017), Personal history of transient ischemic attack (TIA), and cerebral infarction without residual deficits, and Personal history of urinary (tract) infections (03/31/2019).    Past Surgical History:  She has a past surgical history that includes Hip surgery (10/25/2016); Total abdominal hysterectomy (10/19/2016); Ankle surgery (10/19/2016); Knee surgery (10/19/2016); and Other surgical history (02/07/2017).      Social History:  She reports that she has never smoked. She has never used smokeless tobacco. She reports that she does not drink alcohol and does not use drugs.    Family History:  Family History   Problem Relation Name Age of Onset    Breast  "cancer Sister      Breast cancer Other          Allergies:  Penicillins, Azithromycin, Hydromorphone, Niacin, and Statins-hmg-coa reductase inhibitors    Outpatient Medications:  Current Outpatient Medications   Medication Instructions    calcium carbonate-vitamin D3 600 mg-20 mcg (800 unit) tablet 1 tablet, oral, Daily    carvedilol (Coreg) 3.125 mg tablet 1 tablet, oral, 2 times daily with meals    cholecalciferol, vitamin D3, (cholecalciferol, vit D3,,bulk,) crystals 2,000 units every morning.    clobetasoL-emollient 0.05 % cream 1 Application, Topical, See admin instructions    cranberry extract (Cranberry Concentrate) 500 mg capsule oral    cyanocobalamin, vitamin B-12, 2,500 mcg tablet, sublingual SL tablet sublingual    Cymbalta 20 mg DR capsule 1 capsule, oral, Daily    DOCOSAHEXAENOIC ACID ORAL 100 mg, oral, Daily RT    Eliquis 5 mg tablet 1 tablet, oral, 2 times daily    Jardiance 10 mg 1 tablet, oral, Daily    lisinopril 20 mg tablet 1 tablet, oral, 2 times daily    magnesium oxide (Mag-Ox) 400 mg (241.3 mg magnesium) tablet 1 tablet, oral, Daily    omega-3 fatty acids-fish oil (Fish OiL) 340-1,000 mg capsule oral, 2 times daily    pantoprazole (ProtoNix) 40 mg EC tablet oral    potassium, sodium phosphates (Phosphorous Supplement) 280-160-250 mg packet Take 1 packet PO with meal    torsemide (Demadex) 20 mg tablet 1 tablet, oral, 2 times daily    zinc sulfate (Zincate) 220 (50 Zn) MG capsule 1 capsule, oral, Daily        ROS:  A 14 point review of systems was done and is negative other than as stated in HPI    Vitals:      9/9/2022     1:26 PM 9/19/2022     1:16 PM 10/14/2022     1:57 PM 3/17/2023    12:56 PM 3/17/2023     2:20 PM 5/12/2023     2:00 PM 9/27/2023     1:20 PM   Vitals   Systolic 134 130 130 124  121 110   Diastolic 72 84 77 76  74 72   Heart Rate 41 58 50 50  60    Temp  36.4 °C (97.6 °F)     36.1 °C (97 °F)   Resp   18   18    Height (in) 1.778 m (5' 10\") 1.778 m (5' 10\")  1.778 m (5' " "10\")   1.778 m (5' 10\")   Weight (lb) 287 287 283 243 278.4 275.3 271   BMI 41.18 kg/m2 41.18 kg/m2 40.61 kg/m2 34.87 kg/m2 39.95 kg/m2 39.5 kg/m2 38.88 kg/m2   BSA (m2) 2.53 m2 2.53 m2 2.51 m2 2.33 m2 2.49 m2 2.48 m2 2.46 m2        Physical Exam:     Constitutional: Cooperative, in no acute distress, alert, appears stated age.  Skin: Skin color, texture, turgor normal. No rashes or lesions.  Head: Normocephalic. No masses, lesions, tenderness or abnormalities  Eyes: Extraocular movements are grossly intact.  Mouth and throat: Mucous membranes moist  Neck: Neck supple, no carotid bruits, no JVD  Respiratory: Lungs clear to auscultation, no wheezing or rhonchi, no use of accessory muscles  Chest wall: No scars, normal excursion with respiration  Cardiovascular: Irregular rhythm, + murmur  Gastrointestinal: Abdomen soft, nontender. Bowel sounds normal. Morbidly obese.  Musculoskeletal: Strength equal in upper extremities  Extremities: 1+ pitting edema  Neurologic: Sensation grossly intact, alert and oriented x3      Intake/Output:   No intake/output data recorded.    Outpatient Medications  Current Outpatient Medications on File Prior to Visit   Medication Sig Dispense Refill    calcium carbonate-vitamin D3 600 mg-20 mcg (800 unit) tablet Take 1 tablet by mouth once daily.      carvedilol (Coreg) 3.125 mg tablet Take 1 tablet (3.125 mg) by mouth 2 times a day with meals.      cholecalciferol, vitamin D3, (cholecalciferol, vit D3,,bulk,) crystals 2,000 units every morning.      clobetasoL-emollient 0.05 % cream Apply 1 Application topically see administration instructions.      cranberry extract (Cranberry Concentrate) 500 mg capsule Take by mouth.      cyanocobalamin, vitamin B-12, 2,500 mcg tablet, sublingual SL tablet Place under the tongue.      Cymbalta 20 mg DR capsule Take 1 capsule (20 mg) by mouth once daily.      DOCOSAHEXAENOIC ACID ORAL Take 100 mg by mouth once daily.      Eliquis 5 mg tablet Take 1 tablet " (5 mg) by mouth 2 times a day.      Jardiance 10 mg Take 1 tablet (10 mg) by mouth once daily.      lisinopril 20 mg tablet Take 1 tablet (20 mg) by mouth 2 times a day.      magnesium oxide (Mag-Ox) 400 mg (241.3 mg magnesium) tablet Take 1 tablet (400 mg) by mouth once daily.      omega-3 fatty acids-fish oil (Fish OiL) 340-1,000 mg capsule Take by mouth twice a day.      pantoprazole (ProtoNix) 40 mg EC tablet Take by mouth.      potassium, sodium phosphates (Phosphorous Supplement) 280-160-250 mg packet Take 1 packet PO with meal 90 packet 1    torsemide (Demadex) 20 mg tablet Take 1 tablet (20 mg) by mouth 2 times a day.      zinc sulfate (Zincate) 220 (50 Zn) MG capsule Take 1 capsule (50 mg of elemental zinc) by mouth once daily.       No current facility-administered medications on file prior to visit.       Labs: (past 26 weeks)  Recent Results (from the past 4368 hour(s))   Basic Metabolic Panel    Collection Time: 05/12/23  2:28 PM   Result Value Ref Range    Glucose 130 (H) 74 - 99 mg/dL    Sodium 139 136 - 145 mmol/L    Potassium 4.1 3.5 - 5.3 mmol/L    Chloride 103 98 - 107 mmol/L    Bicarbonate 27 21 - 32 mmol/L    Anion Gap 13 10 - 20 mmol/L    Urea Nitrogen 26 (H) 6 - 23 mg/dL    Creatinine 1.21 (H) 0.50 - 1.05 mg/dL    GFR Female 43 (A) >90 mL/min/1.73m2    Calcium 9.1 8.6 - 10.3 mg/dL   Magnesium    Collection Time: 05/12/23  2:28 PM   Result Value Ref Range    Magnesium 2.22 1.60 - 2.40 mg/dL   CBC and Auto Differential    Collection Time: 10/03/23  8:52 AM   Result Value Ref Range    WBC 5.3 4.4 - 11.3 x10*3/uL    nRBC 0.0 0.0 - 0.0 /100 WBCs    RBC 5.05 4.00 - 5.20 x10*6/uL    Hemoglobin 14.9 12.0 - 16.0 g/dL    Hematocrit 48.7 (H) 36.0 - 46.0 %    MCV 96 80 - 100 fL    MCH 29.5 26.0 - 34.0 pg    MCHC 30.6 (L) 32.0 - 36.0 g/dL    RDW 14.6 (H) 11.5 - 14.5 %    Platelets 185 150 - 450 x10*3/uL    MPV 9.8 7.5 - 11.5 fL    Neutrophils % 54.3 40.0 - 80.0 %    Immature Granulocytes %, Automated 0.4  0.0 - 0.9 %    Lymphocytes % 31.3 13.0 - 44.0 %    Monocytes % 10.6 2.0 - 10.0 %    Eosinophils % 2.8 0.0 - 6.0 %    Basophils % 0.6 0.0 - 2.0 %    Neutrophils Absolute 2.86 1.60 - 5.50 x10*3/uL    Immature Granulocytes Absolute, Automated 0.02 0.00 - 0.50 x10*3/uL    Lymphocytes Absolute 1.65 0.80 - 3.00 x10*3/uL    Monocytes Absolute 0.56 0.05 - 0.80 x10*3/uL    Eosinophils Absolute 0.15 0.00 - 0.40 x10*3/uL    Basophils Absolute 0.03 0.00 - 0.10 x10*3/uL   Comprehensive Metabolic Panel    Collection Time: 10/03/23  8:52 AM   Result Value Ref Range    Glucose 129 (H) 74 - 99 mg/dL    Sodium 141 136 - 145 mmol/L    Potassium 4.0 3.5 - 5.3 mmol/L    Chloride 104 98 - 107 mmol/L    Bicarbonate 28 21 - 32 mmol/L    Anion Gap 13 10 - 20 mmol/L    Urea Nitrogen 28 (H) 6 - 23 mg/dL    Creatinine 1.26 (H) 0.50 - 1.05 mg/dL    eGFR 41 (L) >60 mL/min/1.73m*2    Calcium 9.8 8.6 - 10.6 mg/dL    Albumin 4.1 3.4 - 5.0 g/dL    Alkaline Phosphatase 62 33 - 136 U/L    Total Protein 7.6 6.4 - 8.2 g/dL    AST 12 9 - 39 U/L    Bilirubin, Total 1.2 0.0 - 1.2 mg/dL    ALT 8 7 - 45 U/L   Hemoglobin A1C    Collection Time: 10/03/23  8:52 AM   Result Value Ref Range    Hemoglobin A1C 6.8 (H) see below %    Estimated Average Glucose 148 Not Established mg/dL   Lipid Panel    Collection Time: 10/03/23  8:52 AM   Result Value Ref Range    Cholesterol 183 0 - 199 mg/dL    HDL-Cholesterol 33.0 mg/dL    Cholesterol/HDL Ratio 5.5     LDL Calculated 130 (L) 140 - 190 mg/dL    VLDL 20 0 - 40 mg/dL    Triglycerides 100 0 - 149 mg/dL    Non HDL Cholesterol 150 (H) 0 - 149 mg/dL   TSH with reflex to Free T4 if abnormal    Collection Time: 10/03/23  8:52 AM   Result Value Ref Range    Thyroid Stimulating Hormone 2.16 0.44 - 3.98 mIU/L   Vitamin D 25-Hydroxy,Total (for eval of Vitamin D levels)    Collection Time: 10/03/23  8:52 AM   Result Value Ref Range    Vitamin D, 25-Hydroxy, Total 36 30 - 100 ng/mL   Protein, Total    Collection Time: 10/03/23   8:52 AM   Result Value Ref Range    Total Protein 7.6 6.4 - 8.2 g/dL   Serum Protein Electrophoresis    Collection Time: 10/03/23  8:52 AM   Result Value Ref Range    Albumin 4.1 3.4 - 5.0 g/dL    Alpha 1 Globulin 0.3 0.2 - 0.6 g/dL    Alpha 2 Globulin 0.7 0.4 - 1.1 g/dL    Beta Globulin 0.8 0.5 - 1.2 g/dL    Gamma 1.7 (H) 0.5 - 1.4 g/dL    M-PROTEIN 1 0.5 (H)   g/dL    Protein Electrophoresis Comment       Aberrant band detected in the gamma region, consistent with the patient's known monoclonal IgG kappa at 0.5 g/dL. Last detected on 12/12/16 at 0.3 g/dL.    Path Review - Serum Protein Electrophoresis        Reviewed and approved by MORRIS RAMEY on 10/4/23 at 9:17 PM.       Albumin , Urine Random    Collection Time: 10/03/23  9:09 AM   Result Value Ref Range    Albumin, Urine Random 26.3 Not established mg/L    Creatinine, Urine Random 24.8 20.0 - 320.0 mg/dL    Albumin/Creatine Ratio 106.0 (H) <30.0 ug/mg Creat       ECG  No results found for this or any previous visit (from the past 4464 hour(s)).    Echocardiogram  No results found for this or any previous visit from the past 1095 days.      CV Studies:  EKG:No results found for this or any previous visit (from the past 4464 hour(s)).  Echocardiogram:   Echocardiogram     [place] [id number]  Phone [phone] Fax [phone]    TRANSTHORACIC ECHOCARDIOGRAM REPORT      Patient Name:     CE OVALLE Reading Physician:   [id number] Miguelangel Hopkins MD  Study Date:       9/5/2023      Referring Physician: ANDREA TORREZ  MRN/PID:          [id number]      PCP:  Accession/Order#: [id number]  Department Location: [facility]  YOB: 1935      Fellow:  Gender:           F             Nurse:  Admit Date:                     Sonographer:         Tammy Negron [facility]  Admission Status: Outpatient    Additional Staff:  Height:           177.80 cm     CC Report to:  Weight:           124.74 kg      Study Type:          Echocardiogram  BSA:              2.39 m2  Blood Pressure: 121 /74 mmHg    Diagnosis/ICD: I25.10-Atherosclerotic heart disease of native coronary artery  without angina pectoris; I10-Essential (primary) hypertension;  I35.0-Nonrheumatic aortic (valve) stenosis  Indication:    Hypertension  Procedure/CPT: Echo Complete w Full [id number]    Patient History:  Pertinent History: A-Fib and CHF.    Study Detail: The following Echo studies were performed: 2D, M-Mode, Doppler and  color flow.      PHYSICIAN INTERPRETATION:  Left Ventricle: Left ventricular systolic function is normal, with an estimated ejection fraction of 70%. There are no regional wall motion abnormalities. The left ventricular cavity size is normal. Left ventricular diastolic filling was indeterminate.  Left Atrium: The left atrium is mild to moderately dilated.  Right Ventricle: The right ventricle is normal in size. There is low normal right ventricular systolic function.  Right Atrium: The right atrium is moderately dilated.  Aortic Valve: The aortic valve is trileaflet. There is mild to moderate aortic valve cusp calcification. There is mild to moderate aortic valve thickening. There is evidence of moderate to severe aortic valve stenosis.  There is mild aortic valve regurgitation. The peak instantaneous gradient of the aortic valve is 39.7 mmHg. The mean gradient of the aortic valve is 25.1 mmHg.  Mitral Valve: The mitral valve is mildly thickened. There is mild mitral valve regurgitation.  Tricuspid Valve: The tricuspid valve is structurally normal. There is moderate to severe tricuspid regurgitation. The Doppler estimated RVSP is severely elevated at 86.0 mmHg.  Pulmonic Valve: The pulmonic valve is structurally normal. There is physiologic pulmonic valve regurgitation.  Pericardium: There is no pericardial effusion noted. There is a pericardial fat pad present.  Aorta: The aortic root is normal.      CONCLUSIONS:  1. Left  ventricular systolic function is normal with a 70% estimated ejection fraction.  2. There is low normal right ventricular systolic function.  3. The left atrium is mild to moderately dilated.  4. The right atrium is moderately dilated.  5. Moderate to severe tricuspid regurgitation.  6. Severely elevated right ventricular systolic pressure.  7. Moderate to severe aortic valve stenosis.  8. Mild aortic valve regurgitation.    QUANTITATIVE DATA SUMMARY:  2D MEASUREMENTS:  Normal Ranges:  LAs:           5.61 cm   (2.7-4.0cm)  IVSd:          1.58 cm   (0.6-1.1cm)  LVPWd:         1.55 cm   (0.6-1.1cm)  LVIDd:         3.81 cm   (3.9-5.9cm)  LVIDs:         2.32 cm  LV Mass Index: 97.5 g/m2  LV % FS        39.2 %    LA VOLUME:  Normal Ranges:  LA Vol A4C:        107.8 ml   (22+/-6mL/m2)  LA Vol A2C:        102.1 ml  LA Vol BP:         104.9 ml  LA Vol Index A4C:  45.1 ml/m2  LA Vol Index A2C:  42.7 ml/m2  LA Vol Index BP:   43.9 ml/m2  LA Area A4C:       29.8 cm2  LA Area A2C:       29.0 cm2  LA Major Axis A4C: 7.0 cm  LA Major Axis A2C: 7.0 cm  LA Volume Index:   43.9 ml/m2  LA Vol A4C:        103.3 ml  LA Vol A2C:        96.0 ml    RA VOLUME BY A/L METHOD:  Normal Ranges:  RA Area A4C: 30.0 cm2    AORTA MEASUREMENTS:  Normal Ranges:  Ao Sinus, d: 3.00 cm (2.1-3.5cm)  Ao STJ, d:   2.50 cm (1.7-3.4cm)  Asc Ao, d:   3.40 cm (2.1-3.4cm)    LV SYSTOLIC FUNCTION BY 2D PLANIMETRY (MOD):  Normal Ranges:  EF-A4C View: 68.9 % (>=55%)  EF-A2C View: 73.0 %  EF-Biplane:  71.3 %    LV DIASTOLIC FUNCTION:  Normal Ranges:  MV Peak E:    1.02 m/s (0.7-1.2 m/s)  MV e'         0.10 m/s (>8.0)  MV lateral e' 0.12 m/s  MV medial e'  0.07 m/s  E/e' Ratio:   10.78    (<8.0)    MITRAL VALVE:  Normal Ranges:  MV DT: 218 msec (150-240msec)    AORTIC VALVE:  Normal Ranges:  AoV Vmax:                3.15 m/s  (<=1.7m/s)  AoV Peak P.7 mmHg (<20mmHg)  AoV Mean P.1 mmHg (1.7-11.5mmHg)  LVOT Max Jitendra:            1.11 m/s   (<=1.1m/s)  AoV VTI:                 69.20 cm  (18-25cm)  LVOT VTI:                22.53 cm  LVOT Diameter:           1.75 cm   (1.8-2.4cm)  AoV Area, VTI:           0.78 cm2  (2.5-5.5cm2)  AoV Area,Vmax:           0.85 cm2  (2.5-4.5cm2)  AoV Dimensionless Index: 0.33    AORTIC INSUFFICIENCY:  AI Vmax:       3.84 m/s  AI Half-time:  947 msec  AI Decel Time: 3265 msec  AI Decel Rate: 117.64 cm/s2      RIGHT VENTRICLE:  RV Basal 3.94 cm  RV Mid   2.50 cm  RV Major 6.8 cm  TAPSE:   24.5 mm  RV s'    0.10 m/s    TRICUSPID VALVE/RVSP:  Normal Ranges:  Peak TR Velocity: 4.21 m/s  Est. RA Pressure: 15 mmHg  RV Syst Pressure: 86.0 mmHg (< 30mmHg)  IVC Diam:         2.89 cm    AORTA:  Asc Ao Diam 3.37 cm      [id number] Miguelangel Hopkins MD  Electronically signed on 9/5/2023 at 5:02:01 PM         Final     Stress Testing IMGRESULT([id number]:1:1825): No results found for this or any previous visit from the past 1825 days.    Cardiac Catheterization: No results found for this or any previous visit from the past 1825 days.  No results found for this or any previous visit from the past 3650 days.     Cardiac Scoring: No results found for this or any previous visit from the past 1825 days.    AAA : No results found for this or any previous visit from the past 1825 days.    OTHER: No results found for this or any previous visit from the past 1825 days.    LAST IMAGING RESULTS  BI mammo bilateral diagnostic tomosynthesis, BI US breast limited right  Narrative: Interpreted By:  Shan Ellsworth,   STUDY:  BI MAMMO BILATERAL DIAGNOSTIC TOMOSYNTHESIS; BI US BREAST LIMITED  RIGHT;  10/19/2023 1:49 pm; 10/19/2023 2:25 pm      ACCESSION NUMBER(S):  [id number]; [id number]      ORDERING CLINICIAN:  IAN SHELBY      INDICATION:  Signs/Symptoms:abnormal screening mamm; Signs/Symptoms:ABN mammo      COMPARISON:  10/06/2023      FINDINGS:  2D and tomosynthesis images were reviewed at 1 mm slice thickness.  Right breast focused ultrasound and  elastography were performed.      BILATERAL MAMMOGRAMS:      Density: The breasts have scattered fibroglandular density.      The right breast has a spiculated mass in the superomedial quadrant  corresponding to the recent screening mammogram.      The left breast findings on the recent screening mammogram are not  identified and most likely represent overlapping fibroglandular  tissue.      No new suspicious masses or calcifications are identified.      This study was interpreted with CAD. Markers: Jackson- skin lesion;  triangle- palpable abnormality      RIGHT BREAST FOCUSED ULTRASOUND AND ELASTOGRAPHY:      The background echotexture is homogeneous fatty.      12 o'clock 8 cm from the right nipple is an irregular hypoechoic  shadowing mass 10 x 12 x 15 mm corresponding to the mammographic  finding, medium to high stiffness on elastography.      Right axillary imaging demonstrates no adenopathy or mass.      Impression: Right breast: The right breast has a spiculated mass for which  surgical consultation and ultrasound-guided biopsy are recommended.      The patient was referred to our nurse navigator to arrange the  surgical and biopsy appointments.      Left breast: No mammographic evidence of malignancy.      BI-RADS CATEGORY:      BI-RADS CATEGORY:  5 Highly Suggestive of Malignancy.  Recommendation:  Ultrasound - Guided Breast Biopsy.  Recommended Date:  Immediate.  Laterality:  Right.      For any future breast imaging appointments, please call [phone]  [phone]).          MACRO:  None          Signed by: Shan Ellsworth 10/19/2023 4:49 PM  Dictation workstation:   [id number]    Problem List Items Addressed This Visit       (HFpEF) heart failure with preserved ejection fraction (CMS/HCC)    Atherosclerosis of native coronary artery of native heart without angina pectoris    Atrial fibrillation, chronic (CMS/HCC) - Primary    Dyslipidemia    Essential hypertension    Morbid obesity due to excess calories  (CMS/[facility])    Obstructive sleep apnea    Right ventricular systolic dysfunction    Severe aortic valve stenosis    Tricuspid valve regurgitation    Type 2 diabetes mellitus with foot ulcer (CODE) (CMS/[facility])    On apixaban therapy    Preoperative cardiovascular examination         Palomo Membreno DO, FACC, FACOI

## 2023-10-25 NOTE — TELEPHONE ENCOUNTER
Return call in response to patient v/m. This RN Navigator did review again instructions for consultation with Dr. Rueda 11/27 10AM in her office with US bx to follow in Radiology. Patient demonstrates correct read back of instructions after review stating understanding after clarification. Patient appreciative of 2nd review.

## 2023-10-26 PROBLEM — H60.549 ECZEMA OF EXTERNAL AUDITORY CANAL: Status: ACTIVE | Noted: 2020-12-09

## 2023-10-26 PROBLEM — H61.23 BILATERAL IMPACTED CERUMEN: Status: ACTIVE | Noted: 2020-12-09

## 2023-10-26 PROBLEM — E66.01 MORBID (SEVERE) OBESITY DUE TO EXCESS CALORIES (MULTI): Status: ACTIVE | Noted: 2020-07-15

## 2023-10-26 PROBLEM — I48.91 ATRIAL FIBRILLATION AND FLUTTER (MULTI): Status: ACTIVE | Noted: 2020-07-15

## 2023-10-26 PROBLEM — N18.9 CHRONIC KIDNEY DISEASE, UNSPECIFIED: Status: ACTIVE | Noted: 2020-07-15

## 2023-10-26 PROBLEM — R60.9 EDEMA, UNSPECIFIED: Status: ACTIVE | Noted: 2020-07-15

## 2023-10-26 PROBLEM — I48.92 ATRIAL FIBRILLATION AND FLUTTER (MULTI): Status: ACTIVE | Noted: 2020-07-15

## 2023-10-26 PROBLEM — G90.09 OTHER IDIOPATHIC PERIPHERAL AUTONOMIC NEUROPATHY: Status: ACTIVE | Noted: 2020-07-15

## 2023-10-26 PROBLEM — I50.32 CHRONIC DIASTOLIC (CONGESTIVE) HEART FAILURE (MULTI): Status: ACTIVE | Noted: 2020-07-15

## 2023-10-26 PROBLEM — R03.0 ELEVATED BLOOD-PRESSURE READING, WITHOUT DIAGNOSIS OF HYPERTENSION: Status: ACTIVE | Noted: 2020-07-15

## 2023-10-26 PROBLEM — R13.11 DYSPHAGIA, ORAL PHASE: Status: ACTIVE | Noted: 2020-07-15

## 2023-10-26 PROBLEM — H60.509 ACUTE OTITIS EXTERNA: Status: ACTIVE | Noted: 2023-01-01

## 2023-10-26 PROBLEM — G47.33 OBSTRUCTIVE SLEEP APNEA (ADULT) (PEDIATRIC): Status: ACTIVE | Noted: 2020-07-15

## 2023-10-26 PROBLEM — E87.6 HYPOKALEMIA: Status: ACTIVE | Noted: 2020-07-15

## 2023-10-26 PROBLEM — Z86.718 PERSONAL HISTORY OF OTHER VENOUS THROMBOSIS AND EMBOLISM: Status: ACTIVE | Noted: 2020-07-15

## 2023-10-26 PROBLEM — R26.2 DIFFICULTY IN WALKING, NOT ELSEWHERE CLASSIFIED: Status: ACTIVE | Noted: 2020-07-15

## 2023-10-26 PROBLEM — K21.9 GASTRO-ESOPHAGEAL REFLUX DISEASE WITHOUT ESOPHAGITIS: Status: ACTIVE | Noted: 2020-07-15

## 2023-10-26 PROBLEM — E66.01 OBESITY, CLASS III, BMI 40-49.9 (MORBID OBESITY) (MULTI): Status: ACTIVE | Noted: 2020-07-10

## 2023-10-26 PROBLEM — M62.81 MUSCLE WEAKNESS (GENERALIZED): Status: ACTIVE | Noted: 2020-07-15

## 2023-10-26 PROBLEM — I87.2 VENOUS INSUFFICIENCY (CHRONIC) (PERIPHERAL): Status: ACTIVE | Noted: 2020-07-15

## 2023-10-27 NOTE — PROGRESS NOTES
GENERAL SURGERY CLINIC NOTE    Micaela Garcia   1935   [id number]     History Of Present Illness  Micaela Garcia is a 88 y.o. female who presents to the office for evaluation of an abnormal mammogram. She has not noticed any masses or abnormalities. The patient requested the mammogram herself due to her sister recently passing away at 86 from breast cancer. She has a significant family history of malignancy, which is listed below and may not be complete.    The patient underwent menarche at 16 and menopause in her 50s. She had 3 pregnancies, with her first at 20, and 3 children. All of her children have passed away (the last one from COVID-19 a few years ago). She took OCPs for <5 years.    The patient lives in an assisted living facility. She uses a powered chair and is able to only take 2-3 steps at a time due to poor balance. Her brother Umberto is her next of kin.     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 (CMS/HCC), Contusion of left lower leg, subsequent encounter (06/07/2019), Contusion of lower back and pelvis, initial encounter (10/31/2017), Diaphragmatic hernia without obstruction or gangrene, Other abnormal glucose (12/12/2016), Perianal venous thrombosis (03/23/2018), Personal history of diseases of the skin and subcutaneous tissue (07/18/2018), Personal history of diseases of the skin and subcutaneous tissue (06/21/2018), Personal history of other diseases of the digestive system, Personal history of other endocrine, nutritional and metabolic disease, Personal history of other specified conditions (03/26/2019), Personal history of other specified conditions (03/19/2018), Personal history of other specified conditions (02/23/2017), Personal history of other specified conditions (08/15/2017), Personal history of transient ischemic attack (TIA), and cerebral infarction without residual deficits, and Personal history of urinary  (tract) infections (03/31/2019).    Surgical History  She has a past surgical history that includes Hip surgery (10/25/2016); Total abdominal hysterectomy (10/19/2016); Ankle surgery (10/19/2016); Knee surgery (10/19/2016); and Other surgical history (02/07/2017).    Medications  Current Outpatient Medications on File Prior to Visit   Medication Sig Dispense Refill    calcium carbonate-vitamin D3 600 mg-20 mcg (800 unit) tablet Take 1 tablet by mouth once daily.      carvedilol (Coreg) 3.125 mg tablet Take 1 tablet (3.125 mg) by mouth 2 times a day with meals.      cholecalciferol, vitamin D3, (cholecalciferol, vit D3,,bulk,) crystals 2,000 units every morning.      clobetasoL-emollient 0.05 % cream Apply 1 Application topically see administration instructions.      cranberry extract (Cranberry Concentrate) 500 mg capsule Take by mouth.      cyanocobalamin, vitamin B-12, 2,500 mcg tablet, sublingual SL tablet Place under the tongue.      Cymbalta 20 mg DR capsule Take 1 capsule (20 mg) by mouth once daily.      DOCOSAHEXAENOIC ACID ORAL Take 100 mg by mouth once daily.      Eliquis 5 mg tablet Take 1 tablet (5 mg) by mouth 2 times a day.      Jardiance 10 mg Take 1 tablet (10 mg) by mouth once daily.      lisinopril 20 mg tablet Take 1 tablet (20 mg) by mouth 2 times a day.      magnesium oxide (Mag-Ox) 400 mg (241.3 mg magnesium) tablet Take 1 tablet (400 mg) by mouth once daily.      melatonin 5 mg tablet       mometasone (Elocon) 0.1 % cream Apply a thin layer to the affected area(s) by topical route qd prn itching      omega-3 fatty acids-fish oil (Fish OiL) 340-1,000 mg capsule Take by mouth twice a day.      pantoprazole (ProtoNix) 40 mg EC tablet Take by mouth.      potassium, sodium phosphates (Phosphorous Supplement) 280-160-250 mg packet Take 1 packet PO with meal 90 packet 1    torsemide (Demadex) 20 mg tablet Take 1 tablet (20 mg) by mouth 2 times a day.      traMADol (Ultram) 50 mg tablet       zinc  "sulfate (Zincate) 220 (50 Zn) MG capsule Take 1 capsule (50 mg of elemental zinc) by mouth once daily.       No current facility-administered medications on file prior to visit.       Allergies  Penicillins, Azithromycin, Hydromorphone, Niacin, and Statins-hmg-coa reductase inhibitors     Social History  She reports that she has never smoked. She has never used smokeless tobacco. She reports that she does not drink alcohol and does not use drugs.    Family History  Family History   Problem Relation Name Age of Onset    Stroke Mother      Breast cancer Sister      Heart attack Brother      Breast cancer Other     Sister with breast cancer, passed at 86  Paternal cousin with breast cancer at 87, undergoing treatment  Paternal cousin with breast cancer in 70s, passed  Paternal aunt with breast cancer <50, passed  Brother with bladder cancer, passed     Review of Systems   Constitutional:  Negative for chills and fever.   Respiratory:  Negative for cough and shortness of breath.    Cardiovascular:  Negative for chest pain and palpitations.   Gastrointestinal:  Negative for abdominal pain, nausea and vomiting.   Musculoskeletal:  Positive for gait problem.   Neurological:  Negative for dizziness and headaches.   All other systems reviewed and are negative.      Last Recorded Vitals  Blood pressure 135/64, pulse 60, height 1.778 m (5' 10\"), weight 125 kg (275 lb), SpO2 93 %.     Physical Exam  Constitutional:       General: She is not in acute distress.     Appearance: Normal appearance. She is obese. She is not ill-appearing.      Comments: Appears chronically ill   HENT:      Head: Normocephalic and atraumatic.   Cardiovascular:      Rate and Rhythm: Normal rate and regular rhythm.      Comments: Systolic murmur  Pulmonary:      Effort: Pulmonary effort is normal. No respiratory distress.      Breath sounds: Normal breath sounds.   Chest:   Breasts:     Right: Normal.      Left: Normal.   Abdominal:      General: There " is no distension.      Palpations: Abdomen is soft.      Tenderness: There is no abdominal tenderness. There is no guarding.   Lymphadenopathy:      Upper Body:      Right upper body: No axillary or pectoral adenopathy.      Left upper body: No axillary or pectoral adenopathy.   Skin:     General: Skin is warm and dry.   Neurological:      Mental Status: She is alert and oriented to person, place, and time. Mental status is at baseline.   Psychiatric:         Mood and Affect: Mood normal.         Behavior: Behavior normal.       Relevant Results  BI mammo bilateral diagnostic tomosynthesis    Result Date: 10/19/2023  Interpreted By:  Shan Ellsworth, STUDY: BI MAMMO BILATERAL DIAGNOSTIC TOMOSYNTHESIS; BI US BREAST LIMITED RIGHT;  10/19/2023 1:49 pm; 10/19/2023 2:25 pm   ACCESSION NUMBER(S): [id number]; [id number]   ORDERING CLINICIAN: IAN SHELBY   INDICATION: Signs/Symptoms:abnormal screening mamm; Signs/Symptoms:ABN mammo   COMPARISON: 10/06/2023   FINDINGS: 2D and tomosynthesis images were reviewed at 1 mm slice thickness. Right breast focused ultrasound and elastography were performed.   BILATERAL MAMMOGRAMS:   Density: The breasts have scattered fibroglandular density.   The right breast has a spiculated mass in the superomedial quadrant corresponding to the recent screening mammogram.   The left breast findings on the recent screening mammogram are not identified and most likely represent overlapping fibroglandular tissue.   No new suspicious masses or calcifications are identified.   This study was interpreted with CAD. Markers: Anna- skin lesion; triangle- palpable abnormality   RIGHT BREAST FOCUSED ULTRASOUND AND ELASTOGRAPHY:   The background echotexture is homogeneous fatty.   12 o'clock 8 cm from the right nipple is an irregular hypoechoic shadowing mass 10 x 12 x 15 mm corresponding to the mammographic finding, medium to high stiffness on elastography.   Right axillary imaging  demonstrates no adenopathy or mass.       Right breast: The right breast has a spiculated mass for which surgical consultation and ultrasound-guided biopsy are recommended.   The patient was referred to our nurse navigator to arrange the surgical and biopsy appointments.   Left breast: No mammographic evidence of malignancy.   BI-RADS CATEGORY:   BI-RADS CATEGORY:  5 Highly Suggestive of Malignancy. Recommendation:  Ultrasound - Guided Breast Biopsy. Recommended Date:  Immediate. Laterality:  Right.   For any future breast imaging appointments, please call [phone]).     MACRO: None     Signed by: Shan Ellsworth 10/19/2023 4:49 PM Dictation workstation:   [id number]     Assessment and Plan  88 y.o. female with a suspicious 1.5cm spiculated mass in her right breast at 12:00 8cm from the nipple. She is undergoing a biopsy later today and held her Eliquis for 2 full days. I discussed the concern for breast cancer and given her comorbidities, she is at a greatly elevated risk for complications related to invasive treatments. The patient states she is unlikely to want to pursue surgical intervention even if the biopsy shows cancer, and would like to consider pills if possible (hormonal therapy). I discussed that may be very reasonable in her case.     In addition, the patient is seeing an oncologist in [place] for her MGUS as she has some abnormal bloodwork.     I asked her to follow up in 2 weeks' time. I will call her with the initial biopsy results. I will likely present her case at Breast Tumor Board the following week, as the patient is likely to not proceed with surgical intervention. That discussion should be completed within 2 weeks and we can discuss the recommendations then. She expressed her understanding and all questions were answered.    Natalie Rueda MD, FACS  General Surgery

## 2023-10-27 NOTE — PATIENT INSTRUCTIONS
You will receive a phone call with the initial biopsy results. Follow up in the office afterwards to discuss the results and next steps.

## 2023-11-07 NOTE — PROGRESS NOTES
Patient ID: Micaela Garcia is a 88 y.o. female.  Referring Physician: Bel Valadez DO  [address] [facility]  [place] [address]  Primary Care Provider: Bel Valadez DO    Date of Service:  2023    Micaela Garcia is an 88 year old female. She is referred for follow up for her history of MGUS. States that this goes back to  and she followed with Dr. Smyth. Had a BMBx previously. Most recent SPEP 10/3/23 showed M protein 0.5g/dL IgG Kappa, last detected at 0.3g/dL on 16. No SLiM CRAB criteria noted.     Medical History:  Chronic atrial fibrillation  HFpEF/Right ventricular systolic dysfunction   Moderate to severe aortic valve stenosis  Tricuspid valve regurgitation  Coronary artery disease  Hypertension  Dyslipidemia   Prediabetes  Obstructive sleep apnea  Morbid obesity  Breast cancer    Surgical History:  Hip surgery  Total abdominal hysterectomy  Ankle surgery  Knee surgery    Family History:  Mother and sister  of breast cancer  Grandparents both  of cancer     ASSESSMENT and PLAN:    MGUS:  - Longstanding history back to ~, previously followed with Dr. Smyth  - Previous BMBx  - 0.3g/dL IgG Kappa M protein noted 16  - Labs 10/3/23: 0.5g/dL IgG Kappa M protein, no SLiM CRAB criteria  - Ordered osseous survey to assess pain in knees, R arm/shoulder       SUBJECTIVE:  History of Present Illness:  Micaela presents to clinic 23 for a follow up of her longstanding MGUS.    Overall she is doing okay. Recently diagnosed with breast cancer.     Reporting some pain in her bilateral knees and R elbow, arm, and shoulder pain.         Review of Systems   Constitutional: Negative.    HENT: Negative.     Eyes: Negative.    Respiratory: Negative.     Cardiovascular:  Positive for leg swelling.   Gastrointestinal: Negative.    Endocrine: Negative.    Genitourinary: Negative.    Musculoskeletal:  Positive for arthralgias and gait  problem.   Skin: Negative.    Allergic/Immunologic: Negative.    Neurological:  Positive for dizziness and weakness.   Hematological: Negative.    Psychiatric/Behavioral: Negative.         OBJECTIVE:  KPS: Karnofsky Score: 60 - Requires occasional assistance, but is able care for most of personal needs   VS:  /73   Pulse (!) 49   Temp 36.1 °C (97 °F)   Resp 16   SpO2 92%   BSA: There is no height or weight on file to calculate BSA.    Physical Exam  Constitutional:       Appearance: Normal appearance.   HENT:      Head: Normocephalic.      Nose: Nose normal.      Mouth/Throat:      Mouth: Mucous membranes are moist.      Pharynx: Oropharynx is clear.   Eyes:      Extraocular Movements: Extraocular movements intact.      Conjunctiva/sclera: Conjunctivae normal.      Pupils: Pupils are equal, round, and reactive to light.   Cardiovascular:      Rate and Rhythm: Normal rate. Rhythm irregular.      Pulses: Normal pulses.      Heart sounds: Normal heart sounds.   Pulmonary:      Effort: Pulmonary effort is normal.      Breath sounds: Normal breath sounds.   Abdominal:      General: Abdomen is flat. Bowel sounds are normal.      Palpations: Abdomen is soft.   Musculoskeletal:         General: Swelling present.      Cervical back: Normal range of motion and neck supple.   Skin:     General: Skin is warm and dry.   Neurological:      General: No focal deficit present.      Mental Status: She is alert and oriented to person, place, and time. Mental status is at baseline.      Motor: Weakness present.      Gait: Gait abnormal.   Psychiatric:         Mood and Affect: Mood normal.         Behavior: Behavior normal.         Thought Content: Thought content normal.         Judgment: Judgment normal.         Laboratory:  The pertinent laboratory results were reviewed and discussed with the patient.    Lab Results   Component Value Date    WBC 5.3 10/03/2023    HCT 48.7 (H) 10/03/2023    HGB 14.9 10/03/2023      10/03/2023    K 4.0 10/03/2023    CALCIUM 9.8 10/03/2023     10/03/2023    MG 2.22 05/12/2023    ALT 8 10/03/2023    AST 12 10/03/2023    BUN 28 (H) 10/03/2023    CREATININE 1.26 (H) 10/03/2023    SPEP  10/03/2023     Aberrant band detected in the gamma region, consistent with the patient's known monoclonal IgG kappa at 0.5 g/dL. Last detected on 12/12/16 at 0.3 g/dL.      Note: for a comprehensive list of the patient's lab results, access the Results Review activity.    RTC:  1 year    PATTIE Santiago-CNP

## 2023-11-09 NOTE — TUMOR BOARD NOTE
Tumor Board Documentation    Micaela Garcia was presented by Dr. Rueda at our Tumor Board on 11/9/2023, which included representatives from medical, surgical, radiation oncology, pathology and  .    Micaela currently presents as   with history of the following treatments:  .    Additionally, we reviewed previous medical and familial history, history of present illness, and recent lab results along with all available histopathologic and imaging studies. The tumor board considered available treatment options and made the following recommendations:       The following procedures/referrals were also placed: No orders of the defined types were placed in this encounter.        Clinical Trial Status: Not available      National site-specific guidelines were discussed with respect to the case.       Background  1.5 cm mass in right breast  Invasive ductal grade 1, Her2 negative, ER/MA +  cT1N0    Patient indicated they would decline surgery and chemotherapy due to age and comorbidities.    Plan for referral to medical oncology for endocrine therapy.  Referral for genetics.

## 2023-11-13 NOTE — PROGRESS NOTES
GENERAL SURGERY CLINIC NOTE    Micaela Garcia   1935   [id number]     History Of Present Illness  Micaela Gacria is a 88 y.o. female who presents to the office for follow up of an abnormal mammogram after undergoing biopsy. She had minimal bruising and it is healing well. She has not noticed any masses or abnormalities. The patient requested the mammogram herself due to her sister recently passing away at 86 from breast cancer. She has a significant family history of malignancy, which is listed below and may not be complete.    The patient underwent menarche at 16 and menopause in her 50s. She had 3 pregnancies, with her first at 20, and 3 children. All of her children have passed away (the last one from COVID-19 a few years ago). She has 2 living male grandchildren and other more distant relatives. She took OCPs for <5 years.    The patient lives in an assisted living facility. She uses a powered chair and is able to only take 2-3 steps at a time due to poor balance. Her brother Umberto is her next of kin.     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 (CMS/HCC), Contusion of left lower leg, subsequent encounter (06/07/2019), Contusion of lower back and pelvis, initial encounter (10/31/2017), Diaphragmatic hernia without obstruction or gangrene, Other abnormal glucose (12/12/2016), Perianal venous thrombosis (03/23/2018), Personal history of diseases of the skin and subcutaneous tissue (07/18/2018), Personal history of diseases of the skin and subcutaneous tissue (06/21/2018), Personal history of other diseases of the digestive system, Personal history of other endocrine, nutritional and metabolic disease, Personal history of other specified conditions (03/26/2019), Personal history of other specified conditions (03/19/2018), Personal history of other specified conditions (02/23/2017), Personal history of other specified conditions (08/15/2017),  Personal history of transient ischemic attack (TIA), and cerebral infarction without residual deficits, and Personal history of urinary (tract) infections (03/31/2019).    Surgical History  She has a past surgical history that includes Hip surgery (10/25/2016); Total abdominal hysterectomy (10/19/2016); Ankle surgery (10/19/2016); Knee surgery (10/19/2016); and Other surgical history (02/07/2017).    Medications  Current Outpatient Medications on File Prior to Visit   Medication Sig Dispense Refill    calcium carbonate-vitamin D3 600 mg-20 mcg (800 unit) tablet Take 1 tablet by mouth once daily.      carvedilol (Coreg) 3.125 mg tablet Take 1 tablet (3.125 mg) by mouth 2 times a day with meals.      cholecalciferol, vitamin D3, (cholecalciferol, vit D3,,bulk,) crystals 2,000 units every morning.      clobetasoL-emollient 0.05 % cream Apply 1 Application topically see administration instructions.      cranberry extract (Cranberry Concentrate) 500 mg capsule Take by mouth.      cyanocobalamin, vitamin B-12, 2,500 mcg tablet, sublingual SL tablet Place under the tongue.      Cymbalta 20 mg DR capsule Take 1 capsule (20 mg) by mouth once daily.      DOCOSAHEXAENOIC ACID ORAL Take 100 mg by mouth once daily.      Eliquis 5 mg tablet Take 1 tablet (5 mg) by mouth 2 times a day.      Jardiance 10 mg Take 1 tablet (10 mg) by mouth once daily.      lisinopril 20 mg tablet Take 1 tablet (20 mg) by mouth 2 times a day.      magnesium oxide (Mag-Ox) 400 mg (241.3 mg magnesium) tablet Take 1 tablet (400 mg) by mouth once daily.      melatonin 5 mg tablet       mometasone (Elocon) 0.1 % cream Apply a thin layer to the affected area(s) by topical route qd prn itching      nystatin (Mycostatin) 100,000 unit/gram powder       omega-3 fatty acids-fish oil (Fish OiL) 340-1,000 mg capsule Take by mouth twice a day.      pantoprazole (ProtoNix) 40 mg EC tablet Take by mouth.      potassium, sodium phosphates (Phosphorous Supplement)  280-160-250 mg packet Take 1 packet PO with meal 90 packet 1    torsemide (Demadex) 20 mg tablet Take 1 tablet (20 mg) by mouth 2 times a day.      traMADol (Ultram) 50 mg tablet       zinc sulfate (Zincate) 220 (50 Zn) MG capsule Take 1 capsule (50 mg of elemental zinc) by mouth once daily.       No current facility-administered medications on file prior to visit.       Allergies  Penicillins, Hydromorphone, Statins-hmg-coa reductase inhibitors, Azithromycin, and Niacin     Social History  She reports that she has never smoked. She has never used smokeless tobacco. She reports that she does not drink alcohol and does not use drugs.    Family History  Family History   Problem Relation Name Age of Onset    Stroke Mother      Breast cancer Sister      Heart attack Brother      Breast cancer Other     Sister with breast cancer, passed at 86  Paternal cousin with breast cancer at 87, undergoing treatment  Paternal cousin with breast cancer in 70s, passed  Paternal aunt with breast cancer <50, passed  Brother with bladder cancer, passed     Review of Systems   Constitutional:  Negative for chills and fever.   Respiratory:  Negative for cough and shortness of breath.    Cardiovascular:  Negative for chest pain and palpitations.   Gastrointestinal:  Negative for abdominal pain, nausea and vomiting.   Musculoskeletal:  Positive for gait problem.   Neurological:  Negative for dizziness and headaches.   All other systems reviewed and are negative.      Last Recorded Vitals  There were no vitals taken for this visit.     Physical Exam  Constitutional:       General: She is not in acute distress.     Appearance: Normal appearance. She is obese. She is not ill-appearing.      Comments: Appears chronically ill   HENT:      Head: Normocephalic and atraumatic.   Cardiovascular:      Rate and Rhythm: Normal rate and regular rhythm.      Comments: Systolic murmur  Pulmonary:      Effort: Pulmonary effort is normal. No respiratory  distress.      Breath sounds: Normal breath sounds.   Abdominal:      General: There is no distension.      Palpations: Abdomen is soft.      Tenderness: There is no abdominal tenderness. There is no guarding.   Skin:     General: Skin is warm and dry.   Neurological:      Mental Status: She is alert and oriented to person, place, and time. Mental status is at baseline.   Psychiatric:         Mood and Affect: Mood normal.         Behavior: Behavior normal.       Relevant Results  Pathology results reviewed    BI mammo bilateral diagnostic tomosynthesis  Right breast: The right breast has a spiculated mass for which surgical consultation and ultrasound-guided biopsy are recommended.   The patient was referred to our nurse navigator to arrange the surgical and biopsy appointments.   Left breast: No mammographic evidence of malignancy.   BI-RADS CATEGORY:   BI-RADS CATEGORY:  5 Highly Suggestive of Malignancy. Recommendation:  Ultrasound - Guided Breast Biopsy. Recommended Date:  Immediate. Laterality:  Right.   For any future breast imaging appointments, please call [phone]).     MACRO: None     Signed by: Shan Ellsworth 10/19/2023 4:49 PM Dictation workstation:   [id number]     Assessment and Plan  88 y.o. 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 The patient previously stated she was unlikely to want to pursue surgical intervention in the event of cancer, but is willing to consider noninvasive therapy (hormonal therapy). I reviewed her case at Breast Tumor Board 11/9/23 and the consensus recommendation in light of the patient's comorbidities and wishes were hormonal therapy and Medical Genetics referral. The patient was amenable to Genetics referral as long as there is no out of pocket costs with testing. 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 If she needs a referral to a new oncologist, she will call the office. Follow up on an as needed basis. She expressed her understanding and all questions were answered.    Natalie Rueda MD, FACS  General Surgery

## 2023-11-13 NOTE — PATIENT INSTRUCTIONS
You will be referred to Medical Genetics. Please call your oncologist and find out if they are able to manage your breast cancer therapy with medications. Call with any questions or concerns.

## 2023-11-13 NOTE — TELEPHONE ENCOUNTER
Per MURIEL Silva, patient advised that he is unable to treat her breast cancer as her surgeon had asked.  Referral was placed to breast oncologist.  Pt aware to expect a call regarding scheduling this.

## 2023-11-13 NOTE — TELEPHONE ENCOUNTER
Patient called in to see if our team is able to prescribe oral medication for her. She stated she had her FUV with her surgeon in [place] and they referred her back to our office for the oral medication.     I told her I would message Nelson and ask him to call her back to discuss.

## 2023-11-17 NOTE — PROGRESS NOTES
Subjective   Patient ID: Micaela Garcia is a 88 y.o. female who presents for follow-up of congestive heart failure.     Current Outpatient Medications:     calcium carbonate-vitamin D3 600 mg-20 mcg (800 unit) tablet, Take 1 tablet by mouth once daily., Disp: , Rfl:     carvedilol (Coreg) 3.125 mg tablet, Take 1 tablet (3.125 mg) by mouth 2 times a day with meals., Disp: , Rfl:     cholecalciferol, vitamin D3, (cholecalciferol, vit D3,,bulk,) crystals, 2,000 units every morning., Disp: , Rfl:     clobetasoL-emollient 0.05 % cream, Apply 1 Application topically see administration instructions., Disp: , Rfl:     cranberry extract (Cranberry Concentrate) 500 mg capsule, Take by mouth., Disp: , Rfl:     cyanocobalamin, vitamin B-12, 2,500 mcg tablet, sublingual SL tablet, Place under the tongue., Disp: , Rfl:     Cymbalta 20 mg DR capsule, Take 1 capsule (20 mg) by mouth once daily., Disp: , Rfl:     DOCOSAHEXAENOIC ACID ORAL, Take 100 mg by mouth once daily., Disp: , Rfl:     Eliquis 5 mg tablet, Take 1 tablet (5 mg) by mouth 2 times a day., Disp: , Rfl:     Jardiance 10 mg, Take 1 tablet (10 mg) by mouth once daily., Disp: , Rfl:     lisinopril 20 mg tablet, Take 1 tablet (20 mg) by mouth 2 times a day., Disp: , Rfl:     magnesium oxide (Mag-Ox) 400 mg (241.3 mg magnesium) tablet, Take 1 tablet (400 mg) by mouth once daily., Disp: , Rfl:     melatonin 5 mg tablet, , Disp: , Rfl:     mometasone (Elocon) 0.1 % cream, Apply a thin layer to the affected area(s) by topical route qd prn itching, Disp: , Rfl:     nystatin (Mycostatin) 100,000 unit/gram powder, , Disp: , Rfl:     omega 3-dha-epa-fish oil 300 mg (120 mg- 180mg)-1,000 mg capsule, , Disp: , Rfl:     pantoprazole (ProtoNix) 40 mg EC tablet, Take by mouth., Disp: , Rfl:     potassium, sodium phosphates (Phosphorous Supplement) 280-160-250 mg packet, Take 1 packet PO with meal, Disp: 90 packet, Rfl: 1    torsemide (Demadex) 20 mg tablet, Take 1 tablet (20 mg) by  mouth 2 times a day. 40mg twice a day., Disp: , Rfl:     zinc sulfate (Zincate) 220 (50 Zn) MG capsule, Take 1 capsule (50 mg of elemental zinc) by mouth once daily., Disp: , Rfl:      HPI   Patient presents for follow up of chronic heart failure. Current symptoms include: none. She denies chest pressure/discomfort, dyspnea, lower extremity edema, near-syncope, orthopnea, palpitations, paroxysmal nocturnal dyspnea, and syncope. She states she is compliant all of the time with her medications. She states she is compliant most of the time with her diet.    Review of Systems   Constitutional:  Negative for activity change, chills and fever.   HENT:  Negative for hearing loss.    Eyes: Negative.    Respiratory:  Negative for cough, chest tightness, shortness of breath and wheezing.    Cardiovascular:  Negative for chest pain, palpitations and leg swelling.   Gastrointestinal:  Negative for abdominal distention and blood in stool.   Genitourinary:  Negative for hematuria.   Neurological:  Negative for syncope, weakness and light-headedness.   Psychiatric/Behavioral:  Negative for confusion.        Objective     /66 (BP Location: Left arm, Patient Position: Sitting, BP Cuff Size: Large adult)   Pulse 50   Resp 20   Wt 128 kg (282 lb 14.4 oz)   SpO2 95%   BMI 40.59 kg/m²   HR is 55 BPM per my count.     [unfilled]    9/2023 Echocardiogram   CONCLUSIONS:  1. Left ventricular systolic function is normal with a 70% estimated ejection fraction.  2. There is low normal right ventricular systolic function.  3. The left atrium is mild to moderately dilated.  4. The right atrium is moderately dilated.  5. Moderate to severe tricuspid regurgitation.  6. Severely elevated right ventricular systolic pressure.  7. Moderate to severe aortic valve stenosis.  8. Mild aortic valve regurgitation.    Lab Results   Component Value Date    BUN 28 (H) 10/03/2023    CREATININE 1.26 (H) 10/03/2023     (H) 09/09/2022     MG 2.22 05/12/2023    K 4.0 10/03/2023     10/03/2023       Constitutional:       General: NAD   HENT:   Normocephalic.  No other gross abnormality.   No JVD or hepatojugular reflex.  Cardiovascular:      Rate and Rhythm: Normal rate and regular rhythm.      Heart sounds: Normal heart sounds. No murmur heard.   Pulmonary:      Effort: Pulmonary effort is normal.      Breath sounds: Normal breath sounds.   Abdominal:      General: Abdomen is flat. Bowel sounds are normal.      Palpations: Abdomen is soft.   Musculoskeletal:         General: No swelling.   Skin:     General: Skin is warm and dry.        Assessment/Plan     Problem List Items Addressed This Visit    None       Chronic diastolic heart failure   Etiology   AHA Stage: B   NYHA class: 2-3   Volume Status: reasonably compensated  GFR: 41    GDMT:  BB-carvedilol   ARB/ACEI/ARNI - lisinpril   MRA -   SGLT2i - jardiance  Diuretic - torsemide 40 mg twice a day   Device Therapy: none     CHF: no significant medication side effects noted and reasonably well controlled. She appears to be reasonably compensated today.     Emphasized salt restriction.  Encouraged daily monitoring of the patient's weight.  Encouraged regular exercise.  Follow up in 4 months.    2. Atrial fibrillation - chronic:   HR is controlled. OAC with Eliquis and no bleeding.     3. HTN:  suboptimal today.  Will monitor.   She reports BP is usually much better.  States SBP this a.m. 114 mm hg.           Wanda Reeves, APRN-CNP

## 2023-11-21 NOTE — PROGRESS NOTES
Breast Medical Oncology Clinic  Location: [facility]    Visit Type: New Patient Visit    ONC History:    10/6/23: Screening mammogram: R breast spiculated mass  10/19/23: Diagnostic breast imaging: R breast spiculated mass. No other findings. On US 12 o'clock 8 cm from the right nipple is an irregular hypoechoic shadowing mass 10 x 12 x 15 mm corresponding to the mammographic finding. Right axillary imaging demonstrates no adenopathy or mass.  10/27/23: R breast mass biopsy: IDC, G1. ER positive (>95%), OH positive (>95%), HER2 negative.   23: Tumor board discussion: Primary endocrine therapy. Genetics referral    Subjective: History of Present Illness    Patient presents today for NPV for management of recently diagnosed breast cancer.     Oncologic history reviewed above.     She was in her usual state of health at the time of diagnosis. Diagnosed with screening mammogram. Mass is not palpable.     She has declined surgery due to her current health status.     She has been living in an assisted living facility for 9 years due to frequent falls.     She has been using an electric wheelchair for 6 years due to inability to stand.     Reports chronic occasional blurry vision, occasional cough from prior COVID. She has has R arm pain and R leg pain has been ongoing.     GYN History/Pertinent Family history:    Age of first menses: 16 years old  Age of last menses: 51 years old  , FLB 19   Post-menopausal  She did not breastfeed  Uterus/Ovaries:  TAHBSO  OCP: 5 years    Breast Cancer:  Paternal Aunt; Paternal cousin X5, Sister  Ovarian Cancer:  Paternal ovarian  Pancreatic Cancer: None  Other:  Mother had uterine cancer, Paternal grandmother colon cancer, Paternal grandfather prostate cancer    Social History  Micaela Radha  reports that she has never smoked. She has never used smokeless tobacco.  She  reports no history of alcohol use.  She  reports no history of drug use.    ROS:     Review of Systems   All  other systems reviewed and are negative.      Physical Examination:    /73 (BP Location: Left arm, Patient Position: Sitting, BP Cuff Size: Large adult)   Pulse 59   Temp 36.8 °C (98.2 °F) (Skin)     Physical Exam  Vitals and nursing note reviewed.   Constitutional:       General: She is not in acute distress.     Appearance: Normal appearance. She is obese. She is not toxic-appearing.      Comments: Sitting in wheelchair   HENT:      Head: Normocephalic and atraumatic.      Mouth/Throat:      Pharynx: Oropharynx is clear.   Eyes:      Extraocular Movements: Extraocular movements intact.      Conjunctiva/sclera: Conjunctivae normal.   Cardiovascular:      Rate and Rhythm: Normal rate and regular rhythm.   Pulmonary:      Effort: Pulmonary effort is normal. No respiratory distress.   Abdominal:      General: Abdomen is flat. Bowel sounds are normal.      Palpations: Abdomen is soft.   Musculoskeletal:         General: No swelling. Normal range of motion.      Cervical back: Normal range of motion.   Skin:     General: Skin is warm and dry.   Neurological:      General: No focal deficit present.      Mental Status: She is alert.   Psychiatric:         Mood and Affect: Mood normal.       Breast Examination:    Unable to palpable breast mass. No skin or nipple changes noted.     ECOG Performance Status:     [] 0 Fully active, able to carry on all pre-disease performance without restriction  [] 1 Restricted in physically strenuous activity but ambulatory and able to carry out work of a light or sedentary nature, e.g., light house work, office work  [] 2 Ambulatory and capable of all selfcare but unable to carry out any work activities; up and about more than 50% of waking hours  [x] 3 Capable of only limited selfcare; confined to bed or chair more than 50% of waking hours  [] 4 Completely disabled; cannot carry on any selfcare; totally confined to bed or chair  [] 5 Dead     Results:    Labs:  No new pertinent  results    Imaging:  Reviewed above in Onc History    Pathology:  Reviewed above in Onc History    Assessment:     Clinical T1N0 R breast invasive ductal carcinoma, ER positive (>95%), MN positive (>95%), HER2 negative. Declined surgery and chemotherapy given comorbidities and performance status.     Micaela Garcia is a very pleasant 88 y.o. postmenopausal female with newly diagnosed breast cancer with history of MGUS, CHF, HTN, CKD, spinal stenosis and atrial fibrillation. We reviewed the events that led to her diagnosis and subsequent procedures that have taken place. We discussed the features of her cancer that determine the approach to treatment including the size, grade, presence/absence of lymphovascular invasion, lymph node status and hormone receptor status (including Her2-david). Given these findings, we had the following discussion:      Plan:    Surgical Plan: Patient declined  Additional biopsy: No further biopsy indicated  Radiation Plan: Not indicated at this time  Additional staging scans/DEXA/echo: Staging scans not indicated based on current stage, patient history and physical examination.  Additional Path info (i.e Ki67, PDL1): Not indicated  Gene assays:  Did not order given patient declination of surgery and chemotherapy    Systemic treatment plan: Discussed primary endocrine therapy in leiu of definitive breast surgery. Patient has discussed this with her surgeon and her case was also presented at our multidisciplinary tumor board. I have recommended anastrozole. I reviewed the mechanism of action and potential side effects of aromatase inhibitor therapy, including but not limited to arthralgias/myalgias, osteoporosis with potential fracture risk, hot flashes/vasomotor symptoms, and cardiovascular events.  I recommended a baseline bone density evaluation and the patient was provided a requisition for this today. Anastrozole sent to her pharmacy. She will start this immediately.     Intent: Disease  Control   Clinical trial:  N/A   Endocrine therapy: Anastrozole   HER2 treatment: not indicated   Targeted agents:  May discuss CDK4/6i in the future should there be progression of disease on aromatase inhibitor alone.    Chemotherapy: As discussed above; not indicated   BMA: Will discuss at follow-up visit    Access: Not indicated  Supportive meds: No new supportive medications prescribed  Genetic testing: Referral in place  Fertility preservation: Not indicated  Other active problems/orders:     MGUS: Mangaed by hematology team.     Surveillance plan: Will discuss repeat breast imaging about 6 months after initiation of endocrine therapy.    Follow-up: 3 months    Patient expressed understanding of the plan outlined above. She had ample time to ask questions. She understands she can contact us should she have additional questions or issues arise in the interim.

## 2023-11-27 NOTE — TELEPHONE ENCOUNTER
Patient states she had a body scan, Bone density scan ordered and completed last week. Would like to discuss results.     Message sent to provider

## 2023-11-28 NOTE — TELEPHONE ENCOUNTER
Per provider  bone survey was negative for my myeloma related lytic lesions     Discussed with patient. Verbalized understanding.

## 2023-12-04 NOTE — TELEPHONE ENCOUNTER
SPOKE TO PATIENT PLEASE PRINT OUT SCRIPT I NEED TO FAX IT TO THE PHARMACY AND SHE GAVE ME THE NUMBER TO FAX BUT NOT THE NAME OF PHARMACY.

## 2024-01-01 ENCOUNTER — APPOINTMENT (OUTPATIENT)
Dept: HEMATOLOGY/ONCOLOGY | Facility: CLINIC | Age: 89
End: 2024-01-01
Payer: MEDICARE

## 2024-01-01 ENCOUNTER — OFFICE VISIT (OUTPATIENT)
Dept: HEMATOLOGY/ONCOLOGY | Facility: CLINIC | Age: 89
End: 2024-01-01
Payer: MEDICARE

## 2024-01-01 ENCOUNTER — TELEPHONE (OUTPATIENT)
Dept: PRIMARY CARE | Facility: CLINIC | Age: 89
End: 2024-01-01
Payer: COMMERCIAL

## 2024-01-01 ENCOUNTER — TELEPHONE (OUTPATIENT)
Dept: HEMATOLOGY/ONCOLOGY | Facility: CLINIC | Age: 89
End: 2024-01-01
Payer: COMMERCIAL

## 2024-01-01 ENCOUNTER — TELEPHONE (OUTPATIENT)
Dept: INFUSION THERAPY | Facility: HOSPITAL | Age: 89
End: 2024-01-01
Payer: COMMERCIAL

## 2024-01-01 ENCOUNTER — ANCILLARY PROCEDURE (OUTPATIENT)
Dept: RADIOLOGY | Facility: CLINIC | Age: 89
End: 2024-01-01
Payer: COMMERCIAL

## 2024-01-01 VITALS
SYSTOLIC BLOOD PRESSURE: 149 MMHG | TEMPERATURE: 97.3 F | RESPIRATION RATE: 18 BRPM | HEART RATE: 50 BPM | DIASTOLIC BLOOD PRESSURE: 69 MMHG | OXYGEN SATURATION: 92 %

## 2024-01-01 DIAGNOSIS — Z78.0 POST-MENOPAUSAL: ICD-10-CM

## 2024-01-01 DIAGNOSIS — Z08 ENCOUNTER FOR FOLLOW-UP EXAMINATION AFTER COMPLETED TREATMENT FOR MALIGNANT NEOPLASM: ICD-10-CM

## 2024-01-01 DIAGNOSIS — C50.919 MALIGNANT NEOPLASM OF FEMALE BREAST, UNSPECIFIED ESTROGEN RECEPTOR STATUS, UNSPECIFIED LATERALITY, UNSPECIFIED SITE OF BREAST (MULTI): Primary | ICD-10-CM

## 2024-01-01 PROCEDURE — 1126F AMNT PAIN NOTED NONE PRSNT: CPT | Performed by: STUDENT IN AN ORGANIZED HEALTH CARE EDUCATION/TRAINING PROGRAM

## 2024-01-01 PROCEDURE — 99215 OFFICE O/P EST HI 40 MIN: CPT | Performed by: STUDENT IN AN ORGANIZED HEALTH CARE EDUCATION/TRAINING PROGRAM

## 2024-01-01 PROCEDURE — 1036F TOBACCO NON-USER: CPT | Performed by: STUDENT IN AN ORGANIZED HEALTH CARE EDUCATION/TRAINING PROGRAM

## 2024-01-01 PROCEDURE — 3077F SYST BP >= 140 MM HG: CPT | Performed by: STUDENT IN AN ORGANIZED HEALTH CARE EDUCATION/TRAINING PROGRAM

## 2024-01-01 PROCEDURE — 77080 DXA BONE DENSITY AXIAL: CPT | Performed by: RADIOLOGY

## 2024-01-01 PROCEDURE — 3078F DIAST BP <80 MM HG: CPT | Performed by: STUDENT IN AN ORGANIZED HEALTH CARE EDUCATION/TRAINING PROGRAM

## 2024-01-01 PROCEDURE — 77080 DXA BONE DENSITY AXIAL: CPT

## 2024-01-01 ASSESSMENT — PAIN SCALES - GENERAL: PAINLEVEL: 0-NO PAIN

## 2024-01-23 NOTE — TELEPHONE ENCOUNTER
M[facility] called Micaela had a fall this morning and they sent her out by squad just wanted you to know.

## 2024-02-19 NOTE — PROGRESS NOTES
Breast Medical Oncology Clinic  Location: [facility]    Visit Type: Follow-up Visit    ONC History:    10/6/23: Screening mammogram: R breast spiculated mass  10/19/23: Diagnostic breast imaging: R breast spiculated mass. No other findings. On US 12 o'clock 8 cm from the right nipple is an irregular hypoechoic shadowing mass 10 x 12 x 15 mm corresponding to the mammographic finding. Right axillary imaging demonstrates no adenopathy or mass.  10/27/23: R breast mass biopsy: IDC, G1. ER positive (>95%), GA positive (>95%), HER2 negative.   23: Tumor board discussion: Primary endocrine therapy. Genetics referral  Decline surgery.  23: Started primary endocrine therapy with anastrozole    Subjective: History of Present Illness    Since last seen she had a fall- mechanical. Reports she was told she has an old fibular fracture. She has pain from her fall which is being managed at assisted facility. Reports pain in the R thigh and ankle. States developed constipation from change in her pain medications- family member brought her stool softener.     Has been on anastrozole for about 3 months. Tolerating well. Denies hot flashes. Reports aside from pain related to her fall she does not have any other achiness.     Has not yet told her family about her diagnosis of breast cancer.     GYN History/Pertinent Family history:    Age of first menses: 16 years old  Age of last menses: 51 years old  , FLB 19   Post-menopausal  She did not breastfeed  Uterus/Ovaries:  TAHBSO  OCP: 5 years    Breast Cancer:  Paternal Aunt; Paternal cousin X5, Sister  Ovarian Cancer:  Paternal ovarian  Pancreatic Cancer: None  Other:  Mother had uterine cancer, Paternal grandmother colon cancer, Paternal grandfather prostate cancer    Social History  Micaela Misheljeremie  reports that she has never smoked. She has never been exposed to tobacco smoke. She has never used smokeless tobacco.  She  reports no history of alcohol use.  She  reports no  history of drug use.    ROS:     Review of Systems   All other systems reviewed and are negative.      Physical Examination:    /69   Pulse 50   Temp 36.3 °C (97.3 °F)   Resp 18   SpO2 92%     Physical Exam  Vitals and nursing note reviewed.   Constitutional:       General: She is not in acute distress.     Appearance: Normal appearance. She is obese. She is not toxic-appearing.      Comments: Sitting in wheelchair   HENT:      Head: Normocephalic and atraumatic.      Mouth/Throat:      Pharynx: Oropharynx is clear.   Eyes:      Extraocular Movements: Extraocular movements intact.      Conjunctiva/sclera: Conjunctivae normal.   Cardiovascular:      Rate and Rhythm: Normal rate and regular rhythm.   Pulmonary:      Effort: Pulmonary effort is normal. No respiratory distress.   Abdominal:      General: Abdomen is flat. Bowel sounds are normal.      Palpations: Abdomen is soft.   Musculoskeletal:         General: No swelling. Normal range of motion.      Cervical back: Normal range of motion.   Skin:     General: Skin is warm and dry.   Neurological:      General: No focal deficit present.      Mental Status: She is alert.   Psychiatric:         Mood and Affect: Mood normal.       Breast Examination:    Unable to palpable breast mass. No skin or nipple changes noted.     ECOG Performance Status:     [] 0 Fully active, able to carry on all pre-disease performance without restriction  [] 1 Restricted in physically strenuous activity but ambulatory and able to carry out work of a light or sedentary nature, e.g., light house work, office work  [] 2 Ambulatory and capable of all selfcare but unable to carry out any work activities; up and about more than 50% of waking hours  [x] 3 Capable of only limited selfcare; confined to bed or chair more than 50% of waking hours  [] 4 Completely disabled; cannot carry on any selfcare; totally confined to bed or chair  [] 5 Dead     Results:    Labs:  No new pertinent  results    Imaging:  Reviewed above in Onc History    Pathology:  Reviewed above in Onc History    Assessment:     Clinical 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 Declined surgery and chemotherapy given comorbidities and performance status. On primary endocrine therapy with anastrozole since 11/2023.     Micaela Garcia is a very pleasant 88 y.o. postmenopausal female with newly diagnosed breast cancer with history of MGUS, CHF, HTN, CKD, spinal stenosis and atrial fibrillation. Recent fall- history of recurrent falls pre-existing cancer diagnosis. Is tolerating anastrozole well. Will continue current treatment.       Plan:    Surgical Plan: Patient declined  Additional biopsy: No further biopsy indicated  Radiation Plan: Not indicated at this time  Additional staging scans/DEXA/echo: Staging scans not indicated based on current stage, patient history and physical examination.  Additional Path info (i.e Ki67, PDL1): Not indicated  Gene assays:  Did not order given patient declination of surgery and chemotherapy    Systemic treatment plan: Anastrozole     Intent: Disease Control   Clinical trial:  N/A   Endocrine therapy: Anastrozole   HER2 treatment: not indicated   Targeted agents:  May discuss CDK4/6i in the future should there be progression of disease on aromatase inhibitor alone.    Chemotherapy: As discussed above; not indicated   BMA: Not indicated at this time    Access: Not indicated  Supportive meds: No new supportive medications prescribed  Genetic testing: Referral in place  Fertility preservation: Not indicated  Other active problems/orders:     MGUS: Managed by hematology team.   Recurrent falls: has been living in an assisted living facility for 9 years. She has been using an electric wheelchair for 6 years due to inability to stand.     Surveillance plan: Repeat breast imaging in 3 months    Follow-up: 3 months after repeat breast  imaging    Patient expressed understanding of the plan outlined above. She had ample time to ask questions. She understands she can contact us should she have additional questions or issues arise in the interim.

## 2024-02-20 NOTE — TELEPHONE ENCOUNTER
I called [facility] and spoke with Gloria (Nurse). Micaela had a 6 lb weight gain on Friday and her weight yesterday was 280.7 lbs. Today's VS are 185/76, HR 70 95% spo2. RR 20.  Gloria did not think Micaela was short of breath yesterday. Micaela will get out of bed soon today. Nurse did not notice new swelling but did say that edema is difficult to measure for iMcaela.     Patient is taking prescribed medications including Torsemide 20 mg two tablets twice daily. Patient is eating the provided meals at [facility].     I spoke to Wanda Reeves. Patient is to take 60 mg of Torsemide twice daily for the next two days and then return to 40 mg twice a day. Patient should have labs drawn and fax results to [phone] BNP and BMP.

## 2024-03-04 NOTE — TELEPHONE ENCOUNTER
I called [facility] [phone] [address]. I spoke with Gloria NICK and Micaela Received an extra 10 mg of Lisinopril and an extra 40 mg of Torsemide today on 3/4/24 per the NP at the SNF facility.  Staff at [facility] will continue to monitor the Weight gain and hypertension.  Sodium and fluid restrictions were reviewed, and Wanda will be updated.     Per Wanda Reeves:  Rajiv Paige,   Last Cr. 1.8.   I would like to avoid spironolactone due to renal status.   She was bradycardic with HR in 50's last visit..  Will hold the Carvedilol for now.  Increase the Lisinopril to 30 mg once a day.  And she can take an additional 20  mg of Torsemide for weight gain more than 3# or increased edema or sob. Lets see if she can come in for follow up in a week or two. Thanks.  Will you call medication adjustments to the SNF?  Thanks.     These changes were called to Micaela's Nurse Miley. Micaela is scheduled to see Dr. Membreno on 3/11/24 and will see Wanda in the CHF clinic in April.

## 2024-03-06 PROBLEM — E11.9 DIABETES MELLITUS TYPE II, NON INSULIN DEPENDENT (MULTI): Status: ACTIVE | Noted: 2023-01-01

## 2024-04-29 ENCOUNTER — APPOINTMENT (OUTPATIENT)
Dept: CARDIOLOGY | Facility: HOSPITAL | Age: 89
End: 2024-04-29

## 2024-05-21 ENCOUNTER — APPOINTMENT (OUTPATIENT)
Dept: RADIOLOGY | Facility: HOSPITAL | Age: 89
End: 2024-05-21

## 2024-07-01 ENCOUNTER — APPOINTMENT (OUTPATIENT)
Dept: HEMATOLOGY/ONCOLOGY | Facility: CLINIC | Age: 89
End: 2024-07-01
Payer: COMMERCIAL

## 2024-07-08 ENCOUNTER — APPOINTMENT (OUTPATIENT)
Dept: HEMATOLOGY/ONCOLOGY | Facility: CLINIC | Age: 89
End: 2024-07-08

## 2024-11-04 ENCOUNTER — APPOINTMENT (OUTPATIENT)
Dept: HEMATOLOGY/ONCOLOGY | Facility: CLINIC | Age: 89
End: 2024-11-04